# Patient Record
Sex: FEMALE | Race: WHITE | NOT HISPANIC OR LATINO | Employment: FULL TIME | URBAN - METROPOLITAN AREA
[De-identification: names, ages, dates, MRNs, and addresses within clinical notes are randomized per-mention and may not be internally consistent; named-entity substitution may affect disease eponyms.]

---

## 2017-01-03 ENCOUNTER — FCC EXTENDED DOCUMENTATION (OUTPATIENT)
Dept: BEHAVIORAL HEALTH | Facility: CLINIC | Age: 32
End: 2017-01-03

## 2017-01-03 NOTE — PROGRESS NOTES
Discharge Summary  Single Session    Client Name: Dilia Lopez MRN#: [id number] YOB: 1985      Intake / Discharge Date: 8/17/16  -  1/3/2017       DSM5 Diagnoses: (Sustained by DSM5 Criteria Listed Above)  DSM5 Diagnoses: (Sustained by DSM5 Criteria Listed Above)  Diagnoses: 296.34 Major Depressive Disorder, Recurrent Episode, With psychotic features _  300.02 (F41.1) Generalized Anxiety Disorder  309.81 (F43.10) Posttraumatic Stress Disorder (includes Posttraumatic Stress Disorder for Children 6 Years and Younger)  Without dissociative symptoms  Psychosocial & Contextual Factors: good social support; working and in grad school  WHODAS 2.0 (12 item)            This questionnaire asks about difficulties due to health conditions. Health conditions  include  disease or illnesses, other health problems that may be short or long lasting,  injuries, mental health or emotional problems, and problems with alcohol or drugs.                     Think back over the past 30 days and answer these questions, thinking about how much  difficulty you had doing the following activities. For each question, please [place] only  one response.    S1 Standing for long periods such as 30 minutes? None =         1   S2 Taking care of household responsibilities? None =         1   S3 Learning a new task, for example, learning how to get to a new place? Mild =           2   S4 How much of a problem do you have joining community activities (for example, festivals, Yarsanism or other activities) in the same way as anyone else can? None =         1   S5 How much have you been emotionally affected by your health problems? None =         1     In the past 30 days, how much difficulty did you have in:   S6 Concentrating on doing something for ten minutes? Mild =           2   S7 Walking a long distance such as a kilometer (or equivalent)? None =         1   S8 Washing your whole body? None =         1   S9 Getting  dressed? None =         1   S10 Dealing with people you do not know? None =         1   S11 Maintaining a friendship? Mild =           2   S12 Your day to day work? Mild =           2     H1 Overall, in the past 30 days, how many days were these difficulties present? Record number of days 13   H2 In the past 30 days, for how many days were you totally unable to carry out your usual activities or work because of any health condition? Record number of days  0   H3 In the past 30 days, not counting the days that you were totally unable, for how many days did you cut back or reduce your usual activities or work because of any health condition? Record number of days 0             Presenting Concern:  Seeking a new therapist      Reason for Discharge:  Client did not return      Disposition at Time of Last Encounter:   Comments:   n/a     Risk Management:   Client denies a history of suicidal ideation, suicide attempts, self-injurious behavior, homicidal ideation, homicidal behavior and and other safety concerns  A safety and risk management plan has not been developed at this time, however client was given the after-hours number / 911 should there be a change in any of these risk factors.      Referred To:  PCP.  Client can return to [facility] at any point in the future if desired.         Luis Cleveland, LICSW   1/3/2017

## 2017-01-13 ENCOUNTER — TELEPHONE (OUTPATIENT)
Dept: INTERNAL MEDICINE | Facility: CLINIC | Age: 32
End: 2017-01-13

## 2017-01-13 DIAGNOSIS — M54.2 NECK PAIN: ICD-10-CM

## 2017-01-13 DIAGNOSIS — M54.9 BACK PAIN: Primary | ICD-10-CM

## 2017-01-13 NOTE — TELEPHONE ENCOUNTER
Regarding: PER PT REQUESTING PT ORDERS FOR NECK AND BACK ISSUES  Per call from PT - needs to know if she can get orders for PT due to neck and back issues or does she need to come in for an appt?  PT asking for a CB.  PT can be reached at [phone]    January 13, 2017 4:17 PM  LM for patient advising that referral being placed for Physical Therapy.  Dione Franz RN, Care Coordinator

## 2017-01-19 ENCOUNTER — THERAPY VISIT (OUTPATIENT)
Dept: PHYSICAL THERAPY | Facility: CLINIC | Age: 32
End: 2017-01-19
Payer: COMMERCIAL

## 2017-01-19 DIAGNOSIS — M54.50 LUMBAGO: Primary | ICD-10-CM

## 2017-01-19 DIAGNOSIS — M54.2 CERVICALGIA: ICD-10-CM

## 2017-01-19 PROCEDURE — 97112 NEUROMUSCULAR REEDUCATION: CPT | Mod: GP | Performed by: PHYSICAL THERAPIST

## 2017-01-19 PROCEDURE — 97161 PT EVAL LOW COMPLEX 20 MIN: CPT | Mod: GP | Performed by: PHYSICAL THERAPIST

## 2017-01-19 PROCEDURE — 97110 THERAPEUTIC EXERCISES: CPT | Mod: GP | Performed by: PHYSICAL THERAPIST

## 2017-01-20 NOTE — PROGRESS NOTES
[facility] Initial Evaluation  Physical Therapy Initial Examination/Evaluation  January 19, 2017    Dilia Lopez is a 31 year old  female referred to physical therapy by Dr. Nory Sheffield for treatment of neck, hip, and low back pain with Precautions/Restrictions/MD instructions eval and treat    Subjective:  Referring MD visit date: 1/13/17  DOI/onset: Chronic hip pain for over a decade, however, pain has become worse starting 2 weeks ago  Mechanism of injury: Acute pain starting after transferring a patient  DOS N/A  Previous treatment: PT in past with some benefit  Imaging: None  Chief Complaint:   Neck, hip, and low back pain that increases with various activities including sitting, sleeping, and lifting   Pain: rest 1 /10, activity 6/10 with prolonged sitting Described as: ache sometimes sharp Alleviated by: standing, walking Frequency: constant Progression of symptoms since initial onset: same Time of day when pain is worse: day  Sleeping: Will wake on occasion with pain  Occupation: Mental health practioner  Job duties:  Prolonged sitting, driving    Patient's goals are Decrease neck, hip, and low back pain, return to prior level of function without pain    Pertinent PMH: Chemical dependency, overweight, mental illness, depression, smoking   General Health Reported by Patient: good  Return to MD:  6 weeks if no improvement        HPI                    Objective:    System         Lumbar/SI Evaluation  ROM:    AROM Lumbar:   Flexion:            75%, mild pain  Ext:                    75%, mild pain   Side Bend:        Left:  90%, painfree    Right:   Rotation:           Left:  66%, mild pain    Right:  66%, mild pain  Side Glide:        Left:  90%, painfree    Right:           Lumbar Myotomes:    T12-L3 (Hip Flex):  Left: 4+    Right: 4+  L2-4 (Quads):  Left:  5    Right:  5  L4 (Ankle DF):  Left:  5    Right:  5  L5 (Great Toe Ext): Left: 5    Right: 5   S1 (Toe Raise):  Left: 5     Right: 5        Neural Tension/Mobility:  Lumbar:  Normal              Spinal Segmental Conclusions:     Level: Hyper noted at L3, L4 and L5           Cervical/Thoracic Evaluation    AROM:  AROM Cervical:    Flexion:            100%, painfree  Extension:       100%, painfree  Rotation:         Left: 100%, painfree     Right: 100%, painfree  Side Bend:      Left: 90%, mild pain     Right:  90%, mild pain        Cervical Myotomes:  normal                              Spinal Segmental Conclusions:    Level:  Hypo at C2, C3, C7, T1 and T2                                     Hip Evaluation    Hip Strength:      Extension:  Left: 4+/5  Pain:Right: 4+/5    Pain:    Abduction:  Left: 4-/5     Pain:Right: 4-/5    Pain:                                 General     ROS    Assessment/Plan:      Patient is a 31 year old female with cervical, lumbar and both sides hip complaints.    Patient has the following significant findings with corresponding treatment plan.                Diagnosis 1:  Low back pain  Pain -  manual therapy, self management, education and home program  Decreased ROM/flexibility - manual therapy, therapeutic exercise, therapeutic activity   Decreased joint mobility - manual therapy, therapeutic exercise, therapeutic activity and home program  Decreased strength - therapeutic exercise, therapeutic activities and home program  Impaired balance - neuro re-education, therapeutic activities and home program  Diagnosis 2:  Neck pain   Pain -  manual therapy, self management, education and home program  Decreased ROM/flexibility - manual therapy, therapeutic exercise, therapeutic activity and home program  Decreased joint mobility - manual therapy, therapeutic exercise, therapeutic activity and home program  Decreased proprioception - neuro re-education, therapeutic activities and home program  Diagnosis 3:  Bilateral hip pain  Pain -  manual therapy, self management, education and home program  Decreased  ROM/flexibility - manual therapy, therapeutic exercise, therapeutic activity and home program  Decreased strength - therapeutic exercise, therapeutic activities and home program  Decreased proprioception - neuro re-education, therapeutic activities and home program     Therapy Evaluation Codes:   1) History comprised of:   Personal factors that impact the plan of care:      None.    Comorbidity factors that impact the plan of care are:      Chemical Dependency, Depression, Mental illness and Smoking.     Medications impacting care: Anti-depressant.  2) Examination of Body Systems comprised of:   Body structures and functions that impact the plan of care:      Cervical spine, Hip and Lumbar spine.   Activity limitations that impact the plan of care are:      Reading/Computer work, Sitting and Sleeping.  3) Clinical presentation characteristics are:   Stable/Uncomplicated.  4) Decision-Making    Low complexity using standardized patient assessment instrument and/or measureable assessment of functional outcome.  Cumulative Therapy Evaluation is: Low complexity.    Previous and current functional limitations:  (See Goal Flow Sheet for this information)    Short term and Long term goals: (See Goal Flow Sheet for this information)     Communication ability:  Patient appears to be able to clearly communicate and understand verbal and written communication and follow directions correctly.  Treatment Explanation - The following has been discussed with the patient:   RX ordered/plan of care  Anticipated outcomes  Possible risks and side effects  This patient would benefit from PT intervention to resume normal activities.   Rehab potential is good.    Frequency:  1 X week, once daily  Duration:  for 6 weeks  Discharge Plan:  Achieve all LTG.  Independent in home treatment program.  Reach maximal therapeutic benefit.    Please refer to the daily flowsheet for treatment today, total treatment time and time spent performing 1:1  timed codes.

## 2017-01-23 NOTE — PROGRESS NOTES
Subjective:                     and reported as 1/10.                General health as reported by patient is good.  Pertinent medical history includes:  Mental illness, depression and overweight.      Current medications:  Anti-depressants.  Current occupation is Student.    Primary job tasks include:  Prolonged sitting and driving.            Oswestry Score: 16 %                 Objective:    System    Physical Exam    General     ROS    Assessment/Plan:

## 2017-02-09 ENCOUNTER — THERAPY VISIT (OUTPATIENT)
Dept: PHYSICAL THERAPY | Facility: CLINIC | Age: 32
End: 2017-02-09
Payer: COMMERCIAL

## 2017-02-09 DIAGNOSIS — M54.50 LUMBAGO: Primary | ICD-10-CM

## 2017-02-09 DIAGNOSIS — M54.2 CERVICALGIA: ICD-10-CM

## 2017-02-09 PROCEDURE — 97110 THERAPEUTIC EXERCISES: CPT | Mod: GP | Performed by: PHYSICAL THERAPIST

## 2017-02-09 PROCEDURE — 97112 NEUROMUSCULAR REEDUCATION: CPT | Mod: GP | Performed by: PHYSICAL THERAPIST

## 2017-02-14 ENCOUNTER — THERAPY VISIT (OUTPATIENT)
Dept: PHYSICAL THERAPY | Facility: CLINIC | Age: 32
End: 2017-02-14
Payer: COMMERCIAL

## 2017-02-14 DIAGNOSIS — M54.50 LUMBAGO: ICD-10-CM

## 2017-02-14 DIAGNOSIS — M54.2 CERVICALGIA: ICD-10-CM

## 2017-02-14 PROCEDURE — 97112 NEUROMUSCULAR REEDUCATION: CPT | Mod: GP | Performed by: PHYSICAL THERAPIST

## 2017-02-14 PROCEDURE — 97140 MANUAL THERAPY 1/> REGIONS: CPT | Mod: GP | Performed by: PHYSICAL THERAPIST

## 2017-02-14 PROCEDURE — 97110 THERAPEUTIC EXERCISES: CPT | Mod: GP | Performed by: PHYSICAL THERAPIST

## 2017-03-03 ENCOUNTER — THERAPY VISIT (OUTPATIENT)
Dept: PHYSICAL THERAPY | Facility: CLINIC | Age: 32
End: 2017-03-03
Payer: COMMERCIAL

## 2017-03-03 DIAGNOSIS — M54.50 LUMBAGO: ICD-10-CM

## 2017-03-03 DIAGNOSIS — M54.2 CERVICALGIA: ICD-10-CM

## 2017-03-03 PROCEDURE — 97110 THERAPEUTIC EXERCISES: CPT | Mod: GP | Performed by: PHYSICAL THERAPIST

## 2017-03-03 PROCEDURE — 97112 NEUROMUSCULAR REEDUCATION: CPT | Mod: GP | Performed by: PHYSICAL THERAPIST

## 2017-03-03 PROCEDURE — 97140 MANUAL THERAPY 1/> REGIONS: CPT | Mod: GP | Performed by: PHYSICAL THERAPIST

## 2017-03-10 ENCOUNTER — THERAPY VISIT (OUTPATIENT)
Dept: PHYSICAL THERAPY | Facility: CLINIC | Age: 32
End: 2017-03-10
Payer: COMMERCIAL

## 2017-03-10 DIAGNOSIS — M54.50 LUMBAGO: ICD-10-CM

## 2017-03-10 DIAGNOSIS — M54.2 CERVICALGIA: ICD-10-CM

## 2017-03-10 PROCEDURE — 97140 MANUAL THERAPY 1/> REGIONS: CPT | Mod: GP | Performed by: PHYSICAL THERAPIST

## 2017-03-10 PROCEDURE — 97112 NEUROMUSCULAR REEDUCATION: CPT | Mod: GP | Performed by: PHYSICAL THERAPIST

## 2017-03-10 PROCEDURE — 97110 THERAPEUTIC EXERCISES: CPT | Mod: GP | Performed by: PHYSICAL THERAPIST

## 2017-03-28 ENCOUNTER — THERAPY VISIT (OUTPATIENT)
Dept: PHYSICAL THERAPY | Facility: CLINIC | Age: 32
End: 2017-03-28
Payer: COMMERCIAL

## 2017-03-28 DIAGNOSIS — M54.2 CERVICALGIA: ICD-10-CM

## 2017-03-28 DIAGNOSIS — M54.50 LUMBAGO: ICD-10-CM

## 2017-03-28 PROCEDURE — 97110 THERAPEUTIC EXERCISES: CPT | Mod: GP | Performed by: PHYSICAL THERAPIST

## 2017-03-28 PROCEDURE — 97112 NEUROMUSCULAR REEDUCATION: CPT | Mod: GP | Performed by: PHYSICAL THERAPIST

## 2017-03-28 PROCEDURE — 97140 MANUAL THERAPY 1/> REGIONS: CPT | Mod: GP | Performed by: PHYSICAL THERAPIST

## 2017-04-10 ENCOUNTER — THERAPY VISIT (OUTPATIENT)
Dept: PHYSICAL THERAPY | Facility: CLINIC | Age: 32
End: 2017-04-10
Payer: COMMERCIAL

## 2017-04-10 DIAGNOSIS — M54.50 LUMBAGO: ICD-10-CM

## 2017-04-10 DIAGNOSIS — M54.2 CERVICALGIA: ICD-10-CM

## 2017-04-10 PROCEDURE — 97110 THERAPEUTIC EXERCISES: CPT | Mod: GP | Performed by: PHYSICAL THERAPIST

## 2017-04-10 PROCEDURE — 97140 MANUAL THERAPY 1/> REGIONS: CPT | Mod: GP | Performed by: PHYSICAL THERAPIST

## 2017-04-10 PROCEDURE — 97112 NEUROMUSCULAR REEDUCATION: CPT | Mod: GP | Performed by: PHYSICAL THERAPIST

## 2017-04-20 ENCOUNTER — THERAPY VISIT (OUTPATIENT)
Dept: PHYSICAL THERAPY | Facility: CLINIC | Age: 32
End: 2017-04-20
Payer: COMMERCIAL

## 2017-04-20 DIAGNOSIS — M54.2 CERVICALGIA: ICD-10-CM

## 2017-04-20 DIAGNOSIS — M54.50 LUMBAGO: ICD-10-CM

## 2017-04-20 PROCEDURE — 97110 THERAPEUTIC EXERCISES: CPT | Mod: GP | Performed by: PHYSICAL THERAPIST

## 2017-04-20 PROCEDURE — 97140 MANUAL THERAPY 1/> REGIONS: CPT | Mod: GP | Performed by: PHYSICAL THERAPIST

## 2017-04-26 ENCOUNTER — ALLIED HEALTH/NURSE VISIT (OUTPATIENT)
Dept: OPHTHALMOLOGY | Facility: CLINIC | Age: 32
End: 2017-04-26
Attending: OPHTHALMOLOGY
Payer: COMMERCIAL

## 2017-04-26 DIAGNOSIS — H52.203 MYOPIC ASTIGMATISM OF BOTH EYES: Primary | ICD-10-CM

## 2017-04-26 DIAGNOSIS — H52.13 MYOPIC ASTIGMATISM OF BOTH EYES: Primary | ICD-10-CM

## 2017-04-26 PROCEDURE — 40000269 ZZH STATISTIC NO CHARGE FACILITY FEE: Mod: ZF

## 2017-04-26 ASSESSMENT — REFRACTION_MANIFEST
OD_CYLINDER: +0.50
OD_AXIS: 090
OS_SPHERE: -1.50
OS_CYLINDER: +0.50
OD_SPHERE: -1.50
OS_AXIS: 095

## 2017-04-26 ASSESSMENT — REFRACTION_WEARINGRX
OS_AXIS: 100
OD_SPHERE: -1.00
OD_AXIS: 95
OD_CYLINDER: +0.50
OS_CYLINDER: +0.50
OS_SPHERE: -1.00

## 2017-04-26 ASSESSMENT — VISUAL ACUITY
METHOD: SNELLEN - LINEAR
OS_CC: 20/20-
OD_CC: 20/20-2
CORRECTION_TYPE: GLASSES

## 2017-04-26 NOTE — NURSING NOTE
Chief Complaints and History of Present Illnesses   Patient presents with     Allied Health Visit     HPI    Symptoms:           Do you have eye pain now?:  No      Comments:  Pt had new glasses made 1 week ago from online vendor and feels like RX is off. Did not bring new glasses with today. I told her the PD or optical center might be off and causing some problems or with the change in RX she might need to adjust to them. Pt's PG are -1.00 BE and MR today is similar to the one in June 2016, I got -1.50 BE. RG equal and I fogged BE. Asked her to try new glasses first thing in the AM and wear them for as long as she can tolerate. I asked her to try this for one week. Gave her new RX and she will contact online vendor for options.   Casie Carlson COA April 26, 2017 9:50 AM

## 2017-04-26 NOTE — MR AVS SNAPSHOT
After Visit Summary   2017    Dilia Lopez    MRN: [id number]           Patient Information     Date Of Birth          1985        Visit Information        Provider Department      2017 9:00 AM [facility]        Today's Diagnoses     Myopic astigmatism of both eyes    -  1       Follow-ups after your visit        Who to contact     Please call your clinic at [phone] to:    Ask questions about your health    Make or cancel appointments    Discuss your medicines    Learn about your test results    Speak to your doctor   If you have compliments or concerns about an experience at your clinic, or if you wish to file a complaint, please contact [facility] Patient Relations at [phone] or email us at [email or web address]         Additional Information About Your Visit        MyChart Information     [employer] is an electronic gateway that provides easy, online access to your medical records. With CloudCover, you can request a clinic appointment, read your test results, renew a prescription or communicate with your care team.     To sign up for Cloud[employer] visit the website at [email or web address])   You will be asked to enter the access code listed below, as well as some personal information. Please follow the directions to create your username and password.     Your access code is: [email or web address]  Expires: 2017  6:44 PM     Your access code will  in 90 days. If you need help or a new code, please contact your [facility] or call [phone] for assistance.        Care EveryWhere ID     This is your Care EveryWhere ID. This could be used by other organizations to access your [facility] medical records  [id number]         Blood Pressure from Last 3 Encounters:   16 121/75   12/02/15 123/79   08/25/15 111/64    Weight from Last 3 Encounters:   16 78.7 kg (173 lb 8 oz)   12/02/15 81.9 kg  (180 lb 9.6 oz)   04/28/15 75.7 kg (166 lb 14.4 oz)              Today, you had the following     No orders found for display       Primary Care Provider Office Phone # Fax #    Nory Tri Sheffield [phone]       [address]  [facility] [address]        Thank you!     Thank you for choosing EYE CLINIC  for your care. Our goal is always to provide you with excellent care. Hearing back from our patients is one way we can continue to improve our services. Please take a few minutes to complete the written survey that you may receive in the mail after your visit with us. Thank you!             Your Updated Medication List - Protect others around you: Learn how to safely use, store and throw away your medicines at www.disposemymeds.org.          This list is accurate as of: 4/26/17 11:59 PM.  Always use your most recent med list.                   Brand Name Dispense Instructions for use    BACLOFEN PO      Take 10 mg by mouth 3 times daily       benzoyl peroxide 5 % Liqd     226 g    Use daily as directed       clindamycin 1 % lotion    CLEOCIN T    60 mL    Apply topically 2 times daily       cyclobenzaprine 5 MG tablet    FLEXERIL    30 tablet    Take 1 tablet (5 mg) by mouth 3 times daily as needed for muscle spasms       CYMBALTA PO      Take 60 mg by mouth daily       LAMICTAL PO      Take 300 mg by mouth daily       nicotine polacrilex 4 MG gum    [employer] NICOTINE POLACRILEX    240 each    Place 1 each (4 mg) inside cheek as needed for smoking cessation       paliperidone 1.5 MG 24 hr tablet    INVEGA     daily       SUDAFED PO      Take  by mouth as needed.       tretinoin 0.025 % cream    RETIN-A    45 g    Use every other night for 2 weeks then every night thereafter       TYLENOL PO      Take  by mouth as needed.       VISTARIL PO      Take 25 mg by mouth as needed for itching       VITAMIN D PO      Take  by mouth daily.

## 2017-05-01 ENCOUNTER — THERAPY VISIT (OUTPATIENT)
Dept: PHYSICAL THERAPY | Facility: CLINIC | Age: 32
End: 2017-05-01
Payer: COMMERCIAL

## 2017-05-01 DIAGNOSIS — M54.2 CERVICALGIA: ICD-10-CM

## 2017-05-01 DIAGNOSIS — M54.50 LUMBAGO: ICD-10-CM

## 2017-05-01 PROCEDURE — 97110 THERAPEUTIC EXERCISES: CPT | Mod: GP | Performed by: PHYSICAL THERAPIST

## 2017-05-01 PROCEDURE — 97140 MANUAL THERAPY 1/> REGIONS: CPT | Mod: GP | Performed by: PHYSICAL THERAPIST

## 2017-05-25 ENCOUNTER — OFFICE VISIT (OUTPATIENT)
Dept: INTERNAL MEDICINE | Facility: CLINIC | Age: 32
End: 2017-05-25

## 2017-05-25 VITALS
DIASTOLIC BLOOD PRESSURE: 80 MMHG | SYSTOLIC BLOOD PRESSURE: 118 MMHG | RESPIRATION RATE: 20 BRPM | HEART RATE: 63 BPM | BODY MASS INDEX: 28.2 KG/M2 | OXYGEN SATURATION: 98 % | WEIGHT: 174.7 LBS

## 2017-05-25 DIAGNOSIS — A08.4 VIRAL GASTROENTERITIS: ICD-10-CM

## 2017-05-25 DIAGNOSIS — K29.00 ACUTE SUPERFICIAL GASTRITIS WITHOUT HEMORRHAGE: Primary | ICD-10-CM

## 2017-05-25 RX ORDER — CALCIUM CARBONATE 500 MG/1
2 TABLET, CHEWABLE ORAL PRN
COMMUNITY

## 2017-05-25 ASSESSMENT — PAIN SCALES - GENERAL: PAINLEVEL: NO PAIN (0)

## 2017-05-25 NOTE — PATIENT INSTRUCTIONS
[facility] Medication Refill Request Information:  * Please contact your pharmacy regarding ANY request for medication refills.  ** [facility] Prescription Fax = [phone]  * Please allow 3 business days for routine medication refills.  * Please allow 5 business days for controlled substance medication refills.     [facility] Test Result notification information:  *You will be notified with in 7-10 days of your appointment day regarding the results of your test.  If you are on MyChart you will be notified as soon as the provider has reviewed the results and signed off on them.    [facility] [phone]     We will try ranitidine a short course.  We would prefer to not prescribe this long-term, but it can be helpful in the short term.     Please try dietary modification (with coffee in particular) to help remove the source of irritation.     Gastritis (Adult)    Gastritis is inflammation and irritation of the stomach lining. It can be present for a short time (acute) or be long lasting (chronic). Gastritis is often caused by infection with bacteria called H pylori. More than a third of people in the  have this bacteria in their bodies. In many cases, H pylori causes no problems or symptoms. In some people, though, the infection irritates the stomach lining and causes gastritis. Other causes of stomach irritation include drinking alcohol or taking pain-relieving medicines called NSAIDs (such as aspirin or ibuprofen).   Symptoms of gastritis can include:    Abdominal pain or bloating    Loss of appetite    Nausea or vomiting    Vomiting blood or having black stools    Feeling more tired than usual  An inflamed and irritated stomach lining is more likely to develop a sore called an ulcer. To help prevent this, gastritis should be treated.  Home care  If needed, medicines may be prescribed. If you have H pylori infection, treating it will likely relieve your symptoms. Other changes can help  reduce stomach irritation and help it heal.    If you have been prescribed medicines for H pylori infection, take them as directed. Take all of the medicine until it is finished or your healthcare provider tells you to stop, even if you feel better.    Your healthcare provider may recommend avoiding NSAIDs. If you take daily aspirin for your heart or other medical reasons, do not stop without talking to your healthcare provider first.    Avoid drinking alcohol.    Stop smoking. Smoking can irritate the stomach and delay healing. As much as possible, stay away from second hand smoke.  Follow-up care  Follow up with your healthcare provider, or as advised by our staff. Testing may be needed to check for inflammation or an ulcer.  When to seek medical advice  Call your healthcare provider for any of the following:    Stomach pain that gets worse or moves to the lower right abdomen (appendix area)    Chest pain that appears or gets worse, or spreads to the back, neck, shoulder, or arm    Frequent vomiting (can t keep down liquids)    Blood in the stool or vomit (red or black in color)    Feeling weak or dizzy    Fever of 100.4 F (38 C) or higher, or as directed by your healthcare provider    6373-8422 [employer]. [address] [place] [address], [place] [address]. All rights reserved. This information is not intended as a substitute for professional medical care. Always follow your healthcare professional's instructions.

## 2017-05-25 NOTE — MR AVS SNAPSHOT
After Visit Summary   5/25/2017    Dilia Lopez    MRN: [id number]           Patient Information     Date Of Birth          1985        Visit Information        Provider Department      5/25/2017 1:35 PM Flex Ibarra MD [facility]        Today's Diagnoses     Acute superficial gastritis without hemorrhage    -  1      Care Instructions    Primary Care Center Medication Refill Request Information:  * Please contact your pharmacy regarding ANY request for medication refills.  ** [facility] Prescription Fax = [phone]  * Please allow 3 business days for routine medication refills.  * Please allow 5 business days for controlled substance medication refills.     Primary Care Center Test Result notification information:  *You will be notified with in 7-10 days of your appointment day regarding the results of your test.  If you are on MyChart you will be notified as soon as the provider has reviewed the results and signed off on them.    [facility] [phone]     We will try ranitidine a short course.  We would prefer to not prescribe this long-term, but it can be helpful in the short term.     Please try dietary modification (with coffee in particular) to help remove the source of irritation.     Gastritis (Adult)    Gastritis is inflammation and irritation of the stomach lining. It can be present for a short time (acute) or be long lasting (chronic). Gastritis is often caused by infection with bacteria called H pylori. More than a third of people in the [place] have this bacteria in their bodies. In many cases, H pylori causes no problems or symptoms. In some people, though, the infection irritates the stomach lining and causes gastritis. Other causes of stomach irritation include drinking alcohol or taking pain-relieving medicines called NSAIDs (such as aspirin or ibuprofen).   Symptoms of gastritis can include:    Abdominal pain or bloating    Loss of  appetite    Nausea or vomiting    Vomiting blood or having black stools    Feeling more tired than usual  An inflamed and irritated stomach lining is more likely to develop a sore called an ulcer. To help prevent this, gastritis should be treated.  Home care  If needed, medicines may be prescribed. If you have H pylori infection, treating it will likely relieve your symptoms. Other changes can help reduce stomach irritation and help it heal.    If you have been prescribed medicines for H pylori infection, take them as directed. Take all of the medicine until it is finished or your healthcare provider tells you to stop, even if you feel better.    Your healthcare provider may recommend avoiding NSAIDs. If you take daily aspirin for your heart or other medical reasons, do not stop without talking to your healthcare provider first.    Avoid drinking alcohol.    Stop smoking. Smoking can irritate the stomach and delay healing. As much as possible, stay away from second hand smoke.  Follow-up care  Follow up with your healthcare provider, or as advised by our staff. Testing may be needed to check for inflammation or an ulcer.  When to seek medical advice  Call your healthcare provider for any of the following:    Stomach pain that gets worse or moves to the lower right abdomen (appendix area)    Chest pain that appears or gets worse, or spreads to the back, neck, shoulder, or arm    Frequent vomiting (can t keep down liquids)    Blood in the stool or vomit (red or black in color)    Feeling weak or dizzy    Fever of 100.4 F (38 C) or higher, or as directed by your healthcare provider    7128-9937 [employer]. [address] [place] [address], [place] [address]. All rights reserved. This information is not intended as a substitute for professional medical care. Always follow your healthcare professional's instructions.                Follow-ups after your visit        Follow-up notes from your care team     Return in  about 2 months (around 2017) for with me or Dr. Sheffield. .      Your next 10 appointments already scheduled     2017  1:00 PM CDT   (Arrive by 12:45 PM)   PHYSICAL with Nory Sheffield MD   [facility] ([facility])    [address] [place] [address] [id number] [phone]              Who to contact     Please call your clinic at [phone] to:    Ask questions about your health    Make or cancel appointments    Discuss your medicines    Learn about your test results    Speak to your doctor   If you have compliments or concerns about an experience at your clinic, or if you wish to file a complaint, please contact [facility] Patient Relations at [phone] or email us at [email or web address]         Additional Information About Your Visit        [employer] Information     [employer] is an electronic gateway that provides easy, online access to your medical records. With El[employer]serve, you can request a clinic appointment, read your test results, renew a prescription or communicate with your care team.     To sign up for [employer] visit the website at [email or web address]   You will be asked to enter the access code listed below, as well as some personal information. Please follow the directions to create your username and password.     Your access code is: [email or web address]  Expires: 2017  6:44 PM     Your access code will  in 90 days. If you need help or a new code, please contact your [facility] or call [phone] for assistance.        Care EveryWhere ID     This is your Care EveryWhere ID. This could be used by other organizations to access your [place] medical records  [id number]        Your Vitals Were     Pulse Respirations Pulse Oximetry BMI (Body Mass Index)          63 20 98% 28.2 kg/m2         Blood Pressure from Last 3 Encounters:   17  118/80   07/21/16 121/75   12/02/15 123/79    Weight from Last 3 Encounters:   05/25/17 79.2 kg (174 lb 11.2 oz)   07/21/16 78.7 kg (173 lb 8 oz)   12/02/15 81.9 kg (180 lb 9.6 oz)              Today, you had the following     No orders found for display         Today's Medication Changes          These changes are accurate as of: 5/25/17  1:54 PM.  If you have any questions, ask your nurse or doctor.               Start taking these medicines.        Dose/Directions    ranitidine 150 MG tablet   Commonly known as:  ZANTAC   Used for:  Acute superficial gastritis without hemorrhage   Started by:  Flex Ibarra MD        Dose:  150 mg   Take 1 tablet (150 mg) by mouth 2 times daily   Quantity:  60 tablet   Refills:  1            Where to get your medicines      These medications were sent to [facility] [id number] [place] [address] [address] [place] [address] [address]VE [address] AT [facility] OF [facility]  [address] [facility] [address]     Phone:  [phone]     ranitidine 150 MG tablet                Primary Care Provider Office Phone # Fax #    Nory Tri Sheffield [phone]       [address] [address]        Thank you!     Thank you for choosing [facility]  for your care. Our goal is always to provide you with excellent care. Hearing back from our patients is one way we can continue to improve our services. Please take a few minutes to complete the written survey that you may receive in the mail after your visit with us. Thank you!             Your Updated Medication List - Protect others around you: Learn how to safely use, store and throw away your medicines at www.disposemymeds.org.          This list is accurate as of: 5/25/17  1:54 PM.  Always use your most recent med list.                   Brand Name Dispense Instructions for use    BACLOFEN PO      Take 10 mg by mouth 3 times daily       benzoyl peroxide 5 % Liqd      226 g    Use daily as directed       calcium carbonate 500 MG chewable tablet    TUMS     Take 2 chew tab by mouth as needed for heartburn       clindamycin 1 % lotion    CLEOCIN T    60 mL    Apply topically 2 times daily       cyclobenzaprine 5 MG tablet    FLEXERIL    30 tablet    Take 1 tablet (5 mg) by mouth 3 times daily as needed for muscle spasms       CYMBALTA PO      Take 90 mg by mouth daily       LAMICTAL PO      Take 225 mg by mouth daily       nicotine polacrilex 4 MG gum    [employer]S NICOTINE POLACRILEX    240 each    Place 1 each (4 mg) inside cheek as needed for smoking cessation       paliperidone 1.5 MG 24 hr tablet    INVEGA     daily       ranitidine 150 MG tablet    ZANTAC    60 tablet    Take 1 tablet (150 mg) by mouth 2 times daily       SUDAFED PO      Take  by mouth as needed.       tretinoin 0.025 % cream    RETIN-A    45 g    Use every other night for 2 weeks then every night thereafter       TYLENOL PO      Take  by mouth as needed.       VISTARIL PO      Take 25 mg by mouth as needed for itching       VITAMIN D PO      Take  by mouth daily.

## 2017-05-25 NOTE — PROGRESS NOTES
SUBJECTIVE:   Dilia Lopez is a 32 year old year old female here for:  Abdominal pain after eating and intermittent diarrhea.      Abdominal pain has been going on for a few months.  Worse with eating/drinking.  Coffee, spicy and fatty foods made it worse.  Does not drink EtOH, smoke or eat tomatoes.  No dry cough, not worse with lying down at night.  Occurs immediately after eating.  Abdominal pain is epigastric, does not radiate.  Pain improved with Tums PRN.  Also associated with nausea either immediately before pain onset or after pain onset.  No nausea when there is no abd pain.  No vomiting. Paternal GF did have partial gastrectomy for reflux.      Diarrhea has been going on for similar time frame, but not temporally associated (diarrhea not directly associated with abd pain).  Diarrhea is intermittent.  Sometimes loose stools, sometimes liquid with urgency.  No black/bloody stools.  No family history of Crohn's, UC or IBS known.  She has not had liquid stools or urgency in past two weeks without addition of any medications.  She thinks diarrhea may be related to stress, but she isn't totally sure.      No fevers, chills, chest pain, SOB, palpitations, new numbness/tingling/weakness, dysuria, hematuria, HA, dysphagia or dysarthria.  She does note intermittent night sweats for the past several months.      Review of systems:  10 point ROS negative except as noted above.    PMH: PTSD, anxiety, severe recurrent major depression with psychotic features.     Family history: No crohns, UC or colon CA. Grandfather w/ partial gastrectomy.       Current Outpatient Prescriptions on File Prior to Visit:  nicotine polacrilex ([employer]S NICOTINE POLACRILEX) 4 MG gum Place 1 each (4 mg) inside cheek as needed for smoking cessation   paliperidone (INVEGA) 1.5 MG 24 hr tablet daily   tretinoin (RETIN-A) 0.025 % cream Use every other night for 2 weeks then every night thereafter   benzoyl peroxide 5 % LIQD Use daily as directed    clindamycin (CLEOCIN T) 1 % lotion Apply topically 2 times daily   BACLOFEN PO Take 10 mg by mouth 3 times daily   cyclobenzaprine (FLEXERIL) 5 MG tablet Take 1 tablet (5 mg) by mouth 3 times daily as needed for muscle spasms   LamoTRIgine (LAMICTAL PO) Take 225 mg by mouth daily    DULoxetine HCl (CYMBALTA PO) Take 90 mg by mouth daily    HydrOXYzine Pamoate (VISTARIL PO) Take 25 mg by mouth as needed for itching   Cholecalciferol (VITAMIN D PO) Take  by mouth daily.   Acetaminophen (TYLENOL PO) Take  by mouth as needed.   Pseudoephedrine HCl (SUDAFED PO) Take  by mouth as needed.     No current facility-administered medications on file prior to visit.      OBJECTIVE:  Physical exam:  /80 (BP Location: Right arm, Patient Position: Chair, Cuff Size: Adult Regular)  Pulse 63  Resp 20  Wt 79.2 kg (174 lb 11.2 oz)  SpO2 98%  BMI 28.2 kg/m2  Body mass index is 28.2 kg/(m^2).   Gen: Well-developed, well-nourished and in no apparent distress  HEENT: normocephalic, atraumatic, no scleral icterus, cranial nerves II-XII grossly intact  CV: regular rate and rhythm, normal S1 S2 and no murmur, click, or rub  Resp: clear to ausculation bilaterally, normal respiratory effort  Abd: bowel sounds presents, soft. Mild epigastric tenderness to deep palpation, non-distended  Psych: normal mood, normal affect, alert and oriented x3    ASSESSMENT and PLAN:   Dilia was seen today for pain.    Diagnoses and all orders for this visit:    Acute superficial gastritis without hemorrhage.  Symptoms correlate with gastritis.  She has no signs of bleeding/danger signs currently, so lower concern for erosion of stomach or duodenum.  Will trial ranitidine x2 months with NO additional refills.  She will RTC in two months for follow-up.  Can pursue additional workup at that time if symptoms are not improved with ranitidine and diet modifications.  Discussed elimination of exacerbating food/drink such as coffee, fatty foods to eliminate  source of irritation.  She is not using any NSAIDS currently.  She is agreeable to this plan and was provided educational resources on discharge from clinic.   -     ranitidine (ZANTAC) 150 MG tablet; Take 1 tablet (150 mg) by mouth 2 times daily    Viral gastroenteritis.  Suspect gastroenteritis.  Doesn't correlate with IBS, since she has been stresed for the past two weeks and diarrhea has progressively resolved.  Not black or bloody.  Will monitor for now.  Pt agreeable to adding yogurt to diet.  She does not think this is associated with lactose.  Return to clinic if symptoms recur and are persistent.     Return to clinic in 2 months with myself or Dr. Sheffield.     Patient seen and discussed with Dr. Keller who agrees with this plan.    Flex Ibarra M.D.   PGY-2 Internal Medicine   Pager: [phone]    Pt was seen and examined with Dr. Ibarra.  I agree with his documentation as noted above.    My additional comments: None    Kurtis Kelelr MD

## 2017-05-26 DIAGNOSIS — Z72.0 TOBACCO ABUSE: ICD-10-CM

## 2017-05-26 DIAGNOSIS — L70.0 ACNE VULGARIS: ICD-10-CM

## 2017-05-26 NOTE — TELEPHONE ENCOUNTER
Last seen 8/31/17, no future appts at this time      1. Plantar wart, right foot: Cryotherapy on 7/27/2015. Discussed benefit at starting a salicylic acid treatment at home in addition to ongoing cryotherapy.    Plantar wart was pared down with a #15 blade prior to cryotherapy.    Cryotherapy procedure note (performed by faculty with medical student assistance): After verbal consent and discussion of risks and benefits including but no limited to dyspigmentation/scar, blister, infection, recurrence, the papule on the right plantar foot was treated with 1-2mm freeze border for 2 cycles with liquid nitrogen. Post cryotherapy instructions were provided.     Start Mediplast (salicylic acid 40% plaster) home treatment. Wait three days after today's cryotherapy to start. Can keep the mediplast on all day or just at night.     2. Acne vulgaris, mild, on topicals only: Continue current treatment plan:    Topical retin-A, 0.025% cream QHS; benzoyl peroxide, 5% liquid daily; clindamycin, 1 % lotion BID      Follow-up in 1 month, earlier for new or changing lesions.

## 2017-05-26 NOTE — TELEPHONE ENCOUNTER
nicotine polacrilex (NICORETTE) 4 MG gum      Last Written Prescription Date: 11-9-16  Last Fill Quantity: 240, # refills: 1    Last Office Visit :  5-25-17   Next Office Visit: 7-7-17    BP Readings from Last 3 Encounters:   05/25/17 118/80   07/21/16 121/75   12/02/15 123/79       Kathleen M Doege RN

## 2017-06-14 ENCOUNTER — TELEPHONE (OUTPATIENT)
Dept: DERMATOLOGY | Facility: CLINIC | Age: 32
End: 2017-06-14

## 2017-06-15 NOTE — TELEPHONE ENCOUNTER
Prior Authorization Retail Medication Request  Medication/Dose: BPO wash 5% liquid cleanser  Diagnosis and ICD code: Acne vulgaris (L70.0)  New/Renewal/Insurance Change PA: Insurance change - on med for 1yr+  Previously Tried and Failed Therapies: BPO wash, tretinoin, spironolactone, clindamycin, hydroxyzine    Insurance ID (if provided): [id number]   Insurance Phone (if provided): [phone]

## 2017-06-19 NOTE — TELEPHONE ENCOUNTER
PA Initiation    Medication: BPO wash 5% liquid cleanser  Insurance Company: [place] Medicaid ([facility]) - Phone [phone] Fax [phone]  Pharmacy Filling the Rx: [facility] [id number] [place] [address] [address] AT [facility] OF [address]  Filling Pharmacy Phone: [phone]  Filling Pharmacy Fax: [phone]  Start Date: 6/19/2017

## 2017-06-22 NOTE — TELEPHONE ENCOUNTER
PRIOR AUTHORIZATION DENIED    Medication: BPO wash 5% liquid cleanser - denied    Denial Date: 6/20/2017    Denial Rational: script is denied because it is not covered by pt's plan                Appeal Information:

## 2017-06-22 NOTE — TELEPHONE ENCOUNTER
Prior Authorization Not Required for NDC [id number]      Medication: BPO wash 5% liquid cleanser - denied  Approved Dose/Quantity:   Reference #: CMM key# [id number]   Insurance Company: [place] Medicaid ([facility]) - Phone [phone] Fax [phone]  Expected CoPay: n/a     CoPay Card Available:      Foundation Assistance Needed:    Which Pharmacy is filling the prescription (Not needed for infusion/clinic administered): [email or web address] [id number] [place] [address] [address] AT [facility] OF [address] [place] [address]  Pharmacy Notified: Yes  Patient Notified: Yes      Spoke with pt's plan and no PA is required for NDC listed above. I spoke with the pharmacy and they will have to see if they are able to get that specific NDC. Pharmacy will contact pt when they are able to process the script. The pt paid cash for script.

## 2017-06-22 NOTE — TELEPHONE ENCOUNTER
Message was left with Dilia to call clinic. When patient calls back ask if she would like to proceed with PA as it will take more time. Medication costs roughly 10-15$ retail if does not want to wait.

## 2017-07-25 DIAGNOSIS — K29.00 ACUTE SUPERFICIAL GASTRITIS WITHOUT HEMORRHAGE: ICD-10-CM

## 2017-07-25 NOTE — TELEPHONE ENCOUNTER
Patient requesting refill of ranitidine, as doesn't have enough to last to follow-up appointment with Dr. Sheffield on 9/1/17.   Last appointment in [facility] 5/25/17 with Dr. Ibarra.

## 2017-08-08 DIAGNOSIS — Z72.0 TOBACCO ABUSE: ICD-10-CM

## 2017-08-08 NOTE — TELEPHONE ENCOUNTER
Nicotine 4 mg gum      Last Written Prescription Date: 5-26-17  Last Fill Quantity: 240, # refills: 1    Last Office Visit :  5-25-17   Next Office Visit: 9-1-17    BP Readings from Last 3 Encounters:   05/25/17 118/80   07/21/16 121/75   12/02/15 123/79       Kathleen M Doege RN

## 2017-09-01 ENCOUNTER — OFFICE VISIT (OUTPATIENT)
Dept: INTERNAL MEDICINE | Facility: CLINIC | Age: 32
End: 2017-09-01

## 2017-09-01 VITALS
HEART RATE: 76 BPM | TEMPERATURE: 98 F | OXYGEN SATURATION: 93 % | SYSTOLIC BLOOD PRESSURE: 102 MMHG | DIASTOLIC BLOOD PRESSURE: 66 MMHG | RESPIRATION RATE: 18 BRPM | WEIGHT: 177.1 LBS | BODY MASS INDEX: 28.58 KG/M2

## 2017-09-01 DIAGNOSIS — R10.84 ABDOMINAL PAIN, GENERALIZED: ICD-10-CM

## 2017-09-01 DIAGNOSIS — Z72.0 TOBACCO ABUSE: ICD-10-CM

## 2017-09-01 DIAGNOSIS — K29.00 ACUTE SUPERFICIAL GASTRITIS WITHOUT HEMORRHAGE: ICD-10-CM

## 2017-09-01 DIAGNOSIS — R10.84 ABDOMINAL PAIN, GENERALIZED: Primary | ICD-10-CM

## 2017-09-01 LAB
ALBUMIN SERPL-MCNC: 4.2 G/DL (ref 3.4–5)
ALP SERPL-CCNC: 50 U/L (ref 40–150)
ALT SERPL W P-5'-P-CCNC: 24 U/L (ref 0–50)
ANION GAP SERPL CALCULATED.3IONS-SCNC: 5 MMOL/L (ref 3–14)
AST SERPL W P-5'-P-CCNC: 13 U/L (ref 0–45)
BILIRUB SERPL-MCNC: 0.4 MG/DL (ref 0.2–1.3)
BUN SERPL-MCNC: 11 MG/DL (ref 7–30)
CALCIUM SERPL-MCNC: 8.9 MG/DL (ref 8.5–10.1)
CHLORIDE SERPL-SCNC: 107 MMOL/L (ref 94–109)
CO2 SERPL-SCNC: 29 MMOL/L (ref 20–32)
CREAT SERPL-MCNC: 0.68 MG/DL (ref 0.52–1.04)
ERYTHROCYTE [DISTWIDTH] IN BLOOD BY AUTOMATED COUNT: 12.4 % (ref 10–15)
FERRITIN SERPL-MCNC: 43 NG/ML (ref 12–150)
GFR SERPL CREATININE-BSD FRML MDRD: >90 ML/MIN/1.7M2
GLUCOSE SERPL-MCNC: 64 MG/DL (ref 70–99)
HCT VFR BLD AUTO: 39.1 % (ref 35–47)
HGB BLD-MCNC: 13 G/DL (ref 11.7–15.7)
MCH RBC QN AUTO: 29.7 PG (ref 26.5–33)
MCHC RBC AUTO-ENTMCNC: 33.2 G/DL (ref 31.5–36.5)
MCV RBC AUTO: 90 FL (ref 78–100)
PLATELET # BLD AUTO: 191 10E9/L (ref 150–450)
POTASSIUM SERPL-SCNC: 3.9 MMOL/L (ref 3.4–5.3)
PROT SERPL-MCNC: 7.6 G/DL (ref 6.8–8.8)
RBC # BLD AUTO: 4.37 10E12/L (ref 3.8–5.2)
SODIUM SERPL-SCNC: 140 MMOL/L (ref 133–144)
WBC # BLD AUTO: 6.1 10E9/L (ref 4–11)

## 2017-09-01 ASSESSMENT — PAIN SCALES - GENERAL: PAINLEVEL: NO PAIN (0)

## 2017-09-01 NOTE — MR AVS SNAPSHOT
After Visit Summary   9/1/2017    Dilia Lopez    MRN: [id number]           Patient Information     Date Of Birth          1985        Visit Information        Provider Department      9/1/2017 1:30 PM Nory Sheffield MD [facility]        Today's Diagnoses     Abdominal pain, generalized    -  1    Tobacco abuse        Acute superficial gastritis without hemorrhage          Care Instructions    Primary Care Center Medication Refill Request Information:  * Please contact your pharmacy regarding ANY request for medication refills.  ** [facility] Prescription Fax = [phone]  * Please allow 3 business days for routine medication refills.  * Please allow 5 business days for controlled substance medication refills.     Primary Care Center Test Result notification information:  *You will be notified with in 7-10 days of your appointment day regarding the results of your test.  If you are on MyChart you will be notified as soon as the provider has reviewed the results and signed off on them.    Primary Care Center [phone]                 Follow-ups after your visit        Your next 10 appointments already scheduled     Sep 01, 2017  2:30 PM CDT   LAB with  LAB   [facility] ([facility])    [address] [place] [id number] [phone]           Patient must bring picture ID. Patient should be prepared to give a urine specimen  Please do not eat 10-12 hours before your appointment if you are coming in fasting for labs on lipids, cholesterol, or glucose (sugar). Pregnant women should follow their Care Team instructions. Water with medications is okay. Do not drink coffee or other fluids. If you have concerns about taking  your medications, please ask at office or if scheduling via Astrid, send a message by clicking on Secure Messaging, Message Your Care Team.              Future tests that were ordered for you today      Open Future Orders        Priority Expected Expires Ordered    Ferritin Routine 2017    Comprehensive metabolic panel Routine 2017 9/15/2017 2017    CBC with platelets Routine 2017 9/15/2017 2017            Who to contact     Please call your clinic at [phone] to:    Ask questions about your health    Make or cancel appointments    Discuss your medicines    Learn about your test results    Speak to your doctor   If you have compliments or concerns about an experience at your clinic, or if you wish to file a complaint, please contact [facility] Patient Relations at [phone] or email us at [email or web address]         Additional Information About Your Visit        [employer]da[employer] Information     uma information technology is an electronic gateway that provides easy, online access to your medical records. With uma information technology, you can request a clinic appointment, read your test results, renew a prescription or communicate with your care team.     To sign up for uma information technology visit the website at [email or web address]   You will be asked to enter the access code listed below, as well as some personal information. Please follow the directions to create your username and password.     Your access code is: [email or web address]  Expires: 2017  6:30 AM     Your access code will  in 90 days. If you need help or a new code, please contact your [facility] Clinic or call [phone] for assistance.        Care EveryWhere ID     This is your Care EveryWhere ID. This could be used by other organizations to access your [place] medical records  [id number]        Your Vitals Were     Pulse Temperature Respirations Last Period Pulse Oximetry Breastfeeding?    76 98  F (36.7  C) (Oral) 18 2017 (Approximate) 93% No    BMI (Body Mass Index)                   28.58 kg/m2            Blood Pressure from Last 3 Encounters:   17 102/66   17  118/80   07/21/16 121/75    Weight from Last 3 Encounters:   09/01/17 80.3 kg (177 lb 1.6 oz)   05/25/17 79.2 kg (174 lb 11.2 oz)   07/21/16 78.7 kg (173 lb 8 oz)                 Today's Medication Changes          These changes are accurate as of: 9/1/17  2:18 PM.  If you have any questions, ask your nurse or doctor.               These medicines have changed or have updated prescriptions.        Dose/Directions    nicotine polacrilex 2 MG gum   Commonly known as:  NICORETTE   This may have changed:    - medication strength  - how much to take   Used for:  Tobacco abuse   Changed by:  Nory Sheffield MD        Dose:  2 mg   Place 1 each (2 mg) inside cheek as needed for smoking cessation   Quantity:  100 each   Refills:  3            Where to get your medicines      These medications were sent to [id number] [facility] [address] AT [facility] OF [address]  [address] [facility] [address]     Phone:  [phone]     nicotine polacrilex 2 MG gum    ranitidine 150 MG tablet                Primary Care Provider Office Phone # Fax #    Nory Sheffield [phone]       [address] [place] [address]  [facility] [address]        Equal Access to Services     JONES BALDWIN AH: Hadii aad ku hadasho Soomaali, waaxda luqadaha, qaybta kaalmada adeegyada, waxay shenin haytawana heart. So [facility] [phone].    ATENCIÓN: Si habla español, tiene a dill disposición servicios gratuitos de asistencia lingüística. Llame al [phone].    We comply with applicable federal civil rights laws and [place] laws. We do not discriminate on the basis of race, color, national origin, age, disability sex, sexual orientation or gender identity.            Thank you!     Thank you for choosing [facility]  for your care. Our goal is always to provide you with excellent care. Hearing back from our patients is one way we can continue to improve  our services. Please take a few minutes to complete the written survey that you may receive in the mail after your visit with us. Thank you!             Your Updated Medication List - Protect others around you: Learn how to safely use, store and throw away your medicines at www.disposemymeds.org.          This list is accurate as of: 9/1/17  2:18 PM.  Always use your most recent med list.                   Brand Name Dispense Instructions for use Diagnosis    BACLOFEN PO      Take 10 mg by mouth 3 times daily        benzoyl peroxide 5 % Liqd     226 g    Use daily as directed    Acne vulgaris       calcium carbonate 500 MG chewable tablet    TUMS     Take 2 chew tab by mouth as needed for heartburn        clindamycin 1 % lotion    CLEOCIN T    60 mL    Apply topically 2 times daily    Acne vulgaris       cyclobenzaprine 5 MG tablet    FLEXERIL    30 tablet    Take 1 tablet (5 mg) by mouth 3 times daily as needed for muscle spasms    Cervicalgia       CYMBALTA PO      Take 60 mg by mouth daily        FISH OIL PO      Take by mouth daily        HYDROXYZINE HCL PO      Take 10 mg by mouth        LAMICTAL PO      Take 250 mg by mouth daily        nicotine polacrilex 2 MG gum    NICORETTE    100 each    Place 1 each (2 mg) inside cheek as needed for smoking cessation    Tobacco abuse       paliperidone 1.5 MG 24 hr tablet    INVEGA     daily        ranitidine 150 MG tablet    ZANTAC    180 tablet    Take 1 tablet (150 mg) by mouth 2 times daily    Acute superficial gastritis without hemorrhage       SUDAFED PO      Take  by mouth as needed.        tretinoin 0.025 % cream    RETIN-A    45 g    Use every other night for 2 weeks then every night thereafter    Acne vulgaris       TYLENOL PO      Take  by mouth as needed.        VISTARIL PO      Take 25 mg by mouth as needed for itching        VITAMIN D PO      Take  by mouth daily.

## 2017-09-01 NOTE — PATIENT INSTRUCTIONS
[facility] Medication Refill Request Information:  * Please contact your pharmacy regarding ANY request for medication refills.  ** [facility] Prescription Fax = [phone]  * Please allow 3 business days for routine medication refills.  * Please allow 5 business days for controlled substance medication refills.     [facility] Test Result notification information:  *You will be notified with in 7-10 days of your appointment day regarding the results of your test.  If you are on MyChart you will be notified as soon as the provider has reviewed the results and signed off on them.    [facility] [phone]

## 2017-09-01 NOTE — PROGRESS NOTES
"CC: Annual physical exam    HPI:    Dilia Lopez is here for a routine physical.      Seen recently for abd pain.  GIven ranitidine.  Does not think it helped.  Continues to have \"gurgling\", sharp pain, and gassiness after eating.  Worse at night.  Can be associated with diarrhea.  2-3 times per week.  Tried to cut out spicy food, coffee, fatty food.  But even with bland foot she will get symptoms.  Diarrhea is maybe slightly improved.  Pain is diffuse.      She has been having increased stress recently.  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  No family hx of UC or crohns.  No blood in stools.  No weight change or fevers.  Previously had some nausea, and that has improved with the ranitidine.      Gyne:  Last pap normal.  CUrrently sexually active with one male partner using condoms.  She may be interested in IUD, versus nexplanon versus depo.      Depression screen:  PHQ-2 Score:     PHQ-2 ( 1999 Pf[employer]) 7/21/2016 4/28/2015   Q1: Little interest or pleasure in doing things 1 0   Q2: Feeling down, depressed or hopeless 1 1   PHQ-2 Score 2 1       Tobacco screen:  History   Smoking Status     Former Smoker   Smokeless Tobacco     Never Used     Comment: 0.5-1ppd; since 2008, presently using gum-7/21/16       Obesity screen:  Body mass index is 28.58 kg/(m^2).      ROS   10 point ROS negative except as per HPI    Medications, allergies, family history, and social history were reviewed and updated in the chart    Past medical history was reviewed and updated  Patient Active Problem List   Diagnosis     Depression     PTSD (post-traumatic stress disorder)     Anxiety     Myopia, bilateral     Cervicalgia     Tension headache     Severe recurrent major depression w/psychotic features, mood-congruent (H)     Lumbago       OBJECTIVE:  Physical Exam:  /66  Pulse 76  Temp 98  F (36.7  C) (Oral)  Resp 18  Wt 80.3 kg (177 lb 1.6 oz)  LMP 08/05/2017 (Approximate)  SpO2 93%  Breastfeeding? No  BMI 28.58 " kg/m2    GENERAL APPEARANCE: healthy, alert and no distress     EYES: EOMI     HENT: ear canals and TM's normal, except L TM obscured by cerumen and nose and mouth without ulcers or lesions     NECK: no adenopathy, no asymmetry, masses, or scars and thyroid normal to palpation     RESP: lungs clear to auscultation - no rales, rhonchi or wheezes     CV: regular rates and rhythm, normal S1 S2, no S3 or S4 and no murmur, click or rub -     ABDOMEN:  soft, nontender, no HSM or masses and bowel sounds normal     MS: extremities normal- no gross deformities noted     SKIN: no suspicious lesions or rashes     NEURO:  mentation intact and speech normal     PSYCH: mentation appears normal. and affect normal/bright     LYMPHATICS: No  cervical, or supraclavicular nodes      ASSESSMENT/PLAN:  # Preventive Care Visit:     Tobacco abuse  Weaning nicotine.  Decrease gum to 2mg  - nicotine polacrilex (NICORETTE) 2 MG gum  Dispense: 100 each; Refill: 3    Abdominal pain, generalized  Suspect IBS versus food intolerance.  Try cutting out lactose.  Gave info on FODMAP diet.  - Comprehensive metabolic panel  - CBC with platelets  - Ferritin    Acute superficial gastritis without hemorrhage  - ranitidine (ZANTAC) 150 MG tablet  Dispense: 180 tablet; Refill: 1    RTC: prn      Nory Sheffield MD

## 2017-09-01 NOTE — NURSING NOTE
Chief Complaint   Patient presents with     Physical     Patient is here for annual physical.      Elida Franklin LPN at 1:29 PM on 9/1/2017.

## 2017-09-05 ENCOUNTER — TELEPHONE (OUTPATIENT)
Dept: INTERNAL MEDICINE | Facility: CLINIC | Age: 32
End: 2017-09-05

## 2017-09-05 DIAGNOSIS — K58.9 IRRITABLE BOWEL SYNDROME: Primary | ICD-10-CM

## 2017-09-05 NOTE — TELEPHONE ENCOUNTER
Pt is requesting a referral for dietician    September 5, 2017 2:10 PM  LM for patient to return call to discuss.   Dione Franz RN, Care Coordinator    September 8, 2017 9:05 AM  Spoke with patient. She is requesting a dietary referral, as she is having difficulty implementing the FODMAP diet that Dr. Sheffield recommended for probable IBS. Referral placed.   Dione Franz RN, Care Coordinator

## 2017-10-11 ENCOUNTER — ALLIED HEALTH/NURSE VISIT (OUTPATIENT)
Dept: GASTROENTEROLOGY | Facility: CLINIC | Age: 32
End: 2017-10-11
Attending: DIETITIAN, REGISTERED
Payer: COMMERCIAL

## 2017-10-11 DIAGNOSIS — R14.3 FLATULENCE, ERUCTATION AND GAS PAIN: ICD-10-CM

## 2017-10-11 DIAGNOSIS — Z71.3 NUTRITIONAL COUNSELING: Primary | ICD-10-CM

## 2017-10-11 DIAGNOSIS — R14.1 FLATULENCE, ERUCTATION AND GAS PAIN: ICD-10-CM

## 2017-10-11 DIAGNOSIS — R14.2 FLATULENCE, ERUCTATION AND GAS PAIN: ICD-10-CM

## 2017-10-11 DIAGNOSIS — R19.7 DIARRHEA: ICD-10-CM

## 2017-10-11 NOTE — PROGRESS NOTES
[facility] Outpatient Medical Nutrition Therapy      Time Spent:  45 minutes  Session Type:  Initial Individual Session  Referring Physician:  Dr. Nory Sheffield    Nutrition Assessment:  Patient is here for initial visit with Registered Dietitian (RD).  Patient is a 32 y.o. female with history of PTSD, anxiety, tobacco use, depression and suspected IBS per MD. Patient c/o recent history of  sharp stomach pain, gas after eating, intermittent diarrhea, constipation and nausea. Met with her PCP and was advised to follow a low FODMAP diet with a focus on avoiding lactose. Patient stated that at first she found it hard to understand/know what to eat, but did some online research and feels she has been doing well following a low FODMAP diet with improvement in her GI symptoms. Patient has been mostly honey free, gluten free, dairy free, soy free and vinegar and onion free. Mostly staying away from tomatoes due to they cause more acid reflux/stomach burning.  Did have a couple slices of pizza the other night. Has been following a lower FODMAP diet for ~4 weeks and feeling improvement in symptoms. Has only had gas a few times per week and only had diarrhea 3x over the 4 weeks. Used to have daily bowel movements but now decreased and having a few times per week and having smaller sized stool. Reported that she is not having difficulty or pain passing stool and they are not hard just smaller amount. Feels that her diet is lower in fiber since following gluten free and lower fiber. Will chew nicorette gum and also Orbitz gum. Drinks energy drink on the weekends 1-3 x 24 oz cans. Drinks 1-4 x 12-16 oz diet soda and working on decreasing diet soda.    Diet Recall:  (two days ago OR other usual meals)   Meal Food    Breakfast Gluten free pancakes with syrup and butter and 2 fried eggs OR usually GF cereal with almond milk OR granola with almond milk   Lunch Sugar glazed almonds at fair OR chicken deli meat vegan cheese, GF  "bread with soy free [place] and banana   Dinner Sausage and mashed potatoes OR usually varies: beef, cabbage, potatoes, zucchini and carrots OR cranberry sausage with GF noodle with olive oil OR tuna salad over lettuce with cranberry and walnuts   Snacks Sugar glazed almonds at fair OR usually 0-3 snacks per day: popcorn or granola or banana   Beverages 2 x 20 oz Coffee with almond milk, diet coke, high ball organic carbonated energy drink, 32 oz water OR usual 16-32 oz water, 1-3 x 24 oz  monster energy drink/week, 1-4 x 12-16 oz diet soda, sometimes herbal teas in evening.   Alcohol Intake denies     Frequency of eating/taking out meals: 1x/week eats out. Sit down restaurant     Height:   Ht Readings from Last 1 Encounters:   07/21/16 1.676 m (5' 6\")     Weight:    Wt Readings from Last 3 Encounters:   09/01/17 80.3 kg (177 lb 1.6 oz)   05/25/17 79.2 kg (174 lb 11.2 oz)   07/21/16 78.7 kg (173 lb 8 oz)      BMI: 28.06 overweight    Labs:  On 9/1/2017 decreased glucose (64). Others reviewed.  Pertinent Medications/vitamin and mineral supplements:   Fish oil, nicorette, ranitidine, TUMS, and vitamin D.   Food Allergies:  No known food allergies  Physical Activity:  Exercises (pool, lite cardio for 30-60 minutes or 10 minutes elliptical for 10 minutes then 20-40 minutes free weights and weight machines,  2-3 times per week + walking 4-5 times per week, 20-30 minutes  Estimated Nutrition Needs based on current body weight of 80kg:   ~2000 calories (25 kcals/kg), 80g protein (1g/kg), 2000 ml fluids (~1 ml/kcal or total fluids per MD).     MALNUTRITION:  % Weight Loss:  None noted  % Intake:  No decreased intake noted  Subcutaneous Fat Loss:  None observed  Muscle Loss:  None observed  Fluid Retention:  None noted    Malnutrition Diagnosis: Patient does not meet two of the above criteria necessary for diagnosing malnutrition  In Context of:  Acute illness or injury    Nutrition Diagnosis:    Altered GI function related to " recent hx of diarrhea, constipation, nausea, gas, pain and burning in stomach after eating as evidenced by patient report.    Nutrition Prescription:    Nutrition Intervention:    1. Nutrition Education/Counseling:  Provided nutrition education on general digestion of carbohydrates, FODMAP (Fermentable Oligo-saccharides, Di-saccharides, Mono-saccharides And Polyols) foods, impact these short chain carbohydrates on GI tract, Gut microbiota and its effects on fiber. Reviewed FODMAP diet guidelines, including length of the diet,  the different phases of the diet plan with elimination phase and discussed recommendation and schedule for adding foods back in. Discussed high and low FODMAP foods. Explained that many chewing gum contains polyols as sweeteners. Explained monster drinks especially if sugar free may contain polyols as well. Encouraged patient to decrease monster drinks and continue to decrease diet soda. Encouraged patient to keep a food journal and write down all food and beverages consumed, time consumed and track any GI symptoms with goal of identifying her threshold for High FODMAP foods and/or tolerance or intolerance to foods. Suggested patient plan meals ahead of time. Since patient has been following low FODMAP diet for 4 weeks, recommended starting challenge phase of adding back in 1-2 higher FODMAP foods at a time every 3-4 days. See goals below. Discussed adequate hydration.     Due to patient report of having some acid reflux type symptoms and burning feeling in stomach sometimes, reviewed GERD diet and lifestyle tips including recommended foods (as tolerated) and foods not recommended if causing symptoms such as chocolate, caffeine, peppermint and spearmint, alcohol, spicy foods and fatty foods as well as recommendation not to smoke. Also recommended not eating 2-3 hours before bedtime.      Patient expressed understanding of diet education and interested in focusing on those goals below at this  time.    Educational Materials Provided:  FODMAP table and food list, GERD nutrition therapy, Fiber/Soluble fiber handout.    Patient verbalized understanding of education provided. See Goals below.     Goals:  1. Keep a daily food journal (write down everything you eat, drink, the amount you eat/drink and timing of meals and also write down any GI symptoms you are having (nausea, vomiting, diarrhea, heartburn, constipation, gas, pain, bloating).    2. Plan meals ahead of time.    3. Start adding back in some higher FODMAP foods: Add in 1-2 of your favorite higher FODMAP foods and eat a serving each day for 3-4 days to see if any changes/GI issues. If tolerated, then add back in 1-2 different higher FODMAP foods for 3-4 days and so on.    4. Do not eat or drink (besides water) for 2-3 hours before bed.    Nutrition Monitoring and Evaluation: Will monitor adherence to nutrition recommendations at future RD visits.     Further Medical Nutrition Therapy:  Recommended  Next Appointment (if applicable):  Patient planning to check with her insurance to see if f/u RD visits are covered. Has scheduling information and number if interested in scheduling f/u.  Patient was encouraged to call/contact RD with any further questions.    Fabiana Moura, MS, RD, LD

## 2017-10-11 NOTE — PATIENT INSTRUCTIONS
It was a pleasure meeting you today:  -Keep a daily food journal (write down everything you eat, drink, the amount you eat/drink and timing of meals and also write down any GI symptoms you are having (nausea, vomiting, diarrhea, heartburn, constipation, gas, pain, bloating).  -Plan meals ahead of time.  -Start adding back in some higher FODMAP foods: Add in 1-2 of your favorite higher FODMAP foods and eat a serving each day for 3-4 days to see if any changes/GI issues. If tolerated, then add back in 1-2 different higher FODMAP foods for 3-4 days and so on.  -Do not eat or drink (besides water) for 2-3 hours before bed.  If you would like to schedule a follow up visit with the Registered Dietitian. Please call [phone] to schedule.

## 2017-10-11 NOTE — MR AVS SNAPSHOT
After Visit Summary   10/11/2017    Dilia Lopez    MRN: [id number]           Patient Information     Date Of Birth          1985        Visit Information        Provider Department      10/11/2017 1:00 PM Fabiana Moura RD M [facility]        Care Instructions    It was a pleasure meeting you today:  -Keep a daily food journal (write down everything you eat, drink, the amount you eat/drink and timing of meals and also write down any GI symptoms you are having (nausea, vomiting, diarrhea, heartburn, constipation, gas, pain, bloating).  -Plan meals ahead of time.  -Start adding back in some higher FODMAP foods: Add in 1-2 of your favorite higher FODMAP foods and eat a serving each day for 3-4 days to see if any changes/GI issues. If tolerated, then add back in 1-2 different higher FODMAP foods for 3-4 days and so on.  -Do not eat or drink (besides water) for 2-3 hours before bed.  If you would like to schedule a follow up visit with the Registered Dietitian. Please call [phone] to schedule.            Follow-ups after your visit        Who to contact     Please call your clinic at [phone] to:    Ask questions about your health    Make or cancel appointments    Discuss your medicines    Learn about your test results    Speak to your doctor   If you have compliments or concerns about an experience at your clinic, or if you wish to file a complaint, please contact [facility] Patient Relations at [phone] or email us at [email or web address]         Additional Information About Your Visit        [employer] Information     [employer] is an electronic gateway that provides easy, online access to your medical records. With [employer], you can request a clinic appointment, read your test results, renew a prescription or communicate with your care team.     To sign up for [employer] visit the website at [email or web address]   You  will be asked to enter the access code listed below, as well as some personal information. Please follow the directions to create your username and password.     Your access code is: [email or web address]  Expires: 2017  6:30 AM     Your access code will  in 90 days. If you need help or a new code, please contact your [facility] or call [phone] for assistance.        Care EveryWhere ID     This is your Care EveryWhere ID. This could be used by other organizations to access your [facility] medical records  [id number]         Blood Pressure from Last 3 Encounters:   17 102/66   17 118/80   16 121/75    Weight from Last 3 Encounters:   17 80.3 kg (177 lb 1.6 oz)   17 79.2 kg (174 lb 11.2 oz)   16 78.7 kg (173 lb 8 oz)              Today, you had the following     No orders found for display       Primary Care Provider Office Phone # Fax #    Nory Tri Sheffield [phone]       [address] [place] [address] [address]  [place] [address]        Equal Access to Services     TOMI BALDWIN : Hadii polo patiño hadasho Soflorenceali, waaxda luqadaha, qaybta kaalmada adeegyada, lakisha heart. So [facility] [phone].    ATENCIÓN: Si habla español, tiene a dill disposición servicios gratuitos de asistencia lingüística. [facility] [phone].    We comply with applicable federal civil rights laws and [place] laws. We do not discriminate on the basis of race, color, national origin, age, disability, sex, sexual orientation, or gender identity.            Thank you!     Thank you for choosing [facility]  for your care. Our goal is always to provide you with excellent care. Hearing back from our patients is one way we can continue to improve our services. Please take a few minutes to complete the written survey that you may receive in the mail after your visit with us. Thank you!             Your Updated  Medication List - Protect others around you: Learn how to safely use, store and throw away your medicines at www.disposemymeds.org.          This list is accurate as of: 10/11/17  1:43 PM.  Always use your most recent med list.                   Brand Name Dispense Instructions for use Diagnosis    BACLOFEN PO      Take 10 mg by mouth 3 times daily        benzoyl peroxide 5 % Liqd     226 g    Use daily as directed    Acne vulgaris       calcium carbonate 500 MG chewable tablet    TUMS     Take 2 chew tab by mouth as needed for heartburn        clindamycin 1 % lotion    CLEOCIN T    60 mL    Apply topically 2 times daily    Acne vulgaris       cyclobenzaprine 5 MG tablet    FLEXERIL    30 tablet    Take 1 tablet (5 mg) by mouth 3 times daily as needed for muscle spasms    Cervicalgia       CYMBALTA PO      Take 60 mg by mouth daily        FISH OIL PO      Take by mouth daily        HYDROXYZINE HCL PO      Take 10 mg by mouth        LAMICTAL PO      Take 250 mg by mouth daily        nicotine polacrilex 2 MG gum    NICORETTE    100 each    Place 1 each (2 mg) inside cheek as needed for smoking cessation    Tobacco abuse       paliperidone 1.5 MG 24 hr tablet    INVEGA     daily        ranitidine 150 MG tablet    ZANTAC    180 tablet    Take 1 tablet (150 mg) by mouth 2 times daily    Acute superficial gastritis without hemorrhage       SUDAFED PO      Take  by mouth as needed.        tretinoin 0.025 % cream    RETIN-A    45 g    Use every other night for 2 weeks then every night thereafter    Acne vulgaris       TYLENOL PO      Take  by mouth as needed.        VISTARIL PO      Take 25 mg by mouth as needed for itching        VITAMIN D PO      Take  by mouth daily.

## 2017-11-02 DIAGNOSIS — L70.0 ACNE VULGARIS: ICD-10-CM

## 2017-11-02 RX ORDER — CLINDAMYCIN PHOSPHATE 10 UG/ML
LOTION TOPICAL 2 TIMES DAILY
Qty: 60 ML | Refills: 3 | OUTPATIENT
Start: 2017-11-02

## 2017-11-27 DIAGNOSIS — Z72.0 TOBACCO ABUSE: ICD-10-CM

## 2017-11-27 DIAGNOSIS — L70.0 ACNE VULGARIS: ICD-10-CM

## 2017-11-27 NOTE — TELEPHONE ENCOUNTER
Last seen 8/31/16: Appointment scheduled for 2/14/18  Acne vulgaris, mild, on topicals only: Continue current treatment plan:    Topical retin-A, 0.025% cream QHS; benzoyl peroxide, 5% liquid daily; clindamycin, 1 % lotion BID      Follow-up in 1 month, earlier for new or changing lesions.

## 2017-11-27 NOTE — TELEPHONE ENCOUNTER
"nicorette   Last Written Prescription Date:  9/1/17  Last Fill Quantity: 100,   # refills: 3  Last Office Visit : 9/1/17  Future Office visit:  No future appt    Routing refill request to clinic nurse for review/approval because:  Pharmacy needs more specific instructions for use such as \" up to 6 daily\" or frequency of use        "

## 2017-11-27 NOTE — TELEPHONE ENCOUNTER
Nisha [facility] Triage[facility] Team,     Hope you all are well!     Patient is scheduled for an acne follow-up + medication renewal with [facility] Bentley. Patient took first avail. with any provider in order to have her prescription renewed. Please follow-up accordingly.     Kind Regards     Nisha

## 2017-11-28 RX ORDER — CLINDAMYCIN PHOSPHATE 10 UG/ML
LOTION TOPICAL 2 TIMES DAILY
Qty: 60 ML | Refills: 3 | Status: SHIPPED | OUTPATIENT
Start: 2017-11-28 | End: 2018-02-14

## 2018-02-14 ENCOUNTER — OFFICE VISIT (OUTPATIENT)
Dept: DERMATOLOGY | Facility: CLINIC | Age: 33
End: 2018-02-14
Payer: COMMERCIAL

## 2018-02-14 DIAGNOSIS — L70.0 ACNE VULGARIS: ICD-10-CM

## 2018-02-14 RX ORDER — CLINDAMYCIN PHOSPHATE 10 UG/ML
LOTION TOPICAL 2 TIMES DAILY
Qty: 60 ML | Refills: 3 | Status: SHIPPED | OUTPATIENT
Start: 2018-02-14

## 2018-02-14 RX ORDER — TRETINOIN 0.25 MG/G
CREAM TOPICAL
Qty: 45 G | Refills: 3 | Status: SHIPPED | OUTPATIENT
Start: 2018-02-14

## 2018-02-14 ASSESSMENT — PAIN SCALES - GENERAL: PAINLEVEL: NO PAIN (0)

## 2018-02-14 NOTE — NURSING NOTE
"Dermatology Rooming Note    Dilia Lopez's goals for this visit include:   Chief Complaint   Patient presents with     Acne     Dilia is here for a follow up on acne she states \" its pretty good\"     Alyssa Martins CMA  "

## 2018-02-14 NOTE — PROGRESS NOTES
"[facility] Dermatology Note      Dermatology Problem List:  1. Acne vulgaris  - tretinoin 0.025% cream, clindamycin 1% lotion, BPO 5% wash  2. Plantar wart  - s/p cryo    Encounter Date: Feb 14, 2018    CC:  Chief Complaint   Patient presents with     Acne     Dilia is here for a follow up on acne she states \" its pretty good\"     History of Present Illness:  Ms. Dilia Lopez is a 32 year old female who presents as a follow-up for acne. The patient was last seen on 2/14/18 when she had a wart treated with cryotherapy and started over the counter salicylic acid. She also continued tretinoin 0.025% cream, BPO 5% wash, and clindamycin 1% lotion. Today the patient reports she is using tretinoin cream, benzoyl peroxide wash, and clindamycin lotion which is working well to control her acne. She states that her pores are still slightly large, but this is not a large concern of hers. She does break out before her menstrual cycle in one spot on her left cheek. Overall she is feeling well. The patient reports no other lesions of concern at this time.     Otherwise, the patient reports no painful, bleeding, nonhealing, or pruritic lesions, and denies new or changing moles.    Past Medical History:   Patient Active Problem List   Diagnosis     Depression     PTSD (post-traumatic stress disorder)     Anxiety     Myopia, bilateral     Cervicalgia     Tension headache     Severe recurrent major depression w/psychotic features, mood-congruent (H)     Lumbago     History reviewed. No pertinent past medical history.  Past Surgical History:   Procedure Laterality Date     NO HISTORY OF SURGERY       Social History:  The patient works as a licensed occupational therapist.    Family History:  There is no family history of skin cancer.    Medications:  Current Outpatient Prescriptions   Medication Sig Dispense Refill     clindamycin (CLEOCIN T) 1 % lotion Apply topically 2 times daily 60 mL 3     nicotine polacrilex " (NICORETTE) 2 MG gum Place 1 each (2 mg) inside cheek as needed for smoking cessation Up to 6 daily. 100 each 3     HYDROXYZINE HCL PO Take 10 mg by mouth       Omega-3 Fatty Acids (FISH OIL PO) Take by mouth daily       ranitidine (ZANTAC) 150 MG tablet Take 1 tablet (150 mg) by mouth 2 times daily 180 tablet 1     benzoyl peroxide 5 % LIQD Use daily as directed 226 g 3     calcium carbonate (TUMS) 500 MG chewable tablet Take 2 chew tab by mouth as needed for heartburn       paliperidone (INVEGA) 1.5 MG 24 hr tablet daily  1     tretinoin (RETIN-A) 0.025 % cream Use every other night for 2 weeks then every night thereafter 45 g 3     BACLOFEN PO Take 10 mg by mouth 3 times daily       cyclobenzaprine (FLEXERIL) 5 MG tablet Take 1 tablet (5 mg) by mouth 3 times daily as needed for muscle spasms 30 tablet 0     LamoTRIgine (LAMICTAL PO) Take 250 mg by mouth daily        DULoxetine HCl (CYMBALTA PO) Take 60 mg by mouth daily        HydrOXYzine Pamoate (VISTARIL PO) Take 25 mg by mouth as needed for itching       Cholecalciferol (VITAMIN D PO) Take  by mouth daily.       Acetaminophen (TYLENOL PO) Take  by mouth as needed.       Pseudoephedrine HCl (SUDAFED PO) Take  by mouth as needed.       Allergies   Allergen Reactions     Clindamycin Hcl Diarrhea     Codeine Unknown     Patient in recovery     Hydrocodone Unknown     Patient in recovery     Keflex [Cephalexin Hcl] Diarrhea     Lurasidone      Other reaction(s): Extrapyramidal Side Effect  akithisia     Oxycodone Unknown     Patient in recovery     Review of Systems:  - Skin: As above in HPI. No additional skin concerns.    Physical exam:  Vitals: There were no vitals taken for this visit.  GEN: This is a well developed, well-nourished female in no acute distress, in a pleasant mood.    SKIN: Acne exam, which includes the face, neck, upper central chest, and upper central back was performed.  - One resolving inflammatory papule on the left mid cheek.  - No other  lesions of concern on areas examined.     Impression/Plan:  1. Hx of plantar wart, right foot    Patient defers treatment.     2. Acne vulgaris, mild, on topicals only: Continue current treatment plan:    Continue tretinoin 0.025% cream - apply a pea size amount to a clean, dry face. Increase to nightly as tolerated.     Continue BPO 5% wash daily.     Continue clindamycin 1% lotion - apply to face twice daily.    Discussion of kenalog injections to lesion on left cheek, patient defers at this time. Consider in future for painful sites.    Follow-up in 1 year, earlier for new or changing lesions.     Staff Involved:  Scribe Disclosure:   I, Lucho Palencia, am serving as a scribe to document services personally performed by Sarah Beth Bentley PA-C, based on data collection and the provider's statements to me.    Provider Disclosure:   The documentation recorded by the scribe accurately reflects the services I personally performed and the decisions made by me.    All risks, benefits and alternatives were discussed with patient.  Patient is in agreement and understands the assessment and plan.  All questions were answered.  Sun Screen Education was given.   Return to Clinic annually or sooner as needed.   Sarah Beth Bentley PA-C   [facility]

## 2018-02-14 NOTE — MR AVS SNAPSHOT
After Visit Summary   2018    Dilia Lopez    MRN: [id number]           Patient Information     Date Of Birth          1985        Visit Information        Provider Department      2018 9:30 AM Sarah Beth Bentley PA-C M [facility] Dermatology        Today's Diagnoses     Acne vulgaris           Follow-ups after your visit        Follow-up notes from your care team     Return in about 1 year (around 2019).      Who to contact     Please call your clinic at [phone] to:    Ask questions about your health    Make or cancel appointments    Discuss your medicines    Learn about your test results    Speak to your doctor            Additional Information About Your Visit        MyChart Information     [employer] is an electronic gateway that provides easy, online access to your medical records. With [employer], you can request a clinic appointment, read your test results, renew a prescription or communicate with your care team.     To sign up for [employer] visit the website at [email or web address]   You will be asked to enter the access code listed below, as well as some personal information. Please follow the directions to create your username and password.     Your access code is: [email or web address]  Expires: 2018  6:31 AM     Your access code will  in 90 days. If you need help or a new code, please contact your [facility] or call [phone] for assistance.        Care EveryWhere ID     This is your Care EveryWhere ID. This could be used by other organizations to access your [facility] medical records  [id number]         Blood Pressure from Last 3 Encounters:   17 102/66   17 118/80   16 121/75    Weight from Last 3 Encounters:   17 80.3 kg (177 lb 1.6 oz)   17 79.2 kg (174 lb 11.2 oz)   16 78.7 kg (173 lb 8 oz)              Today, you had the following     No orders found for display         Today's Medication  Changes          These changes are accurate as of 2/14/18 11:14 AM.  If you have any questions, ask your nurse or doctor.               These medicines have changed or have updated prescriptions.        Dose/Directions    tretinoin 0.025 % cream   Commonly known as:  RETIN-A   This may have changed:  additional instructions   Used for:  Acne vulgaris   Changed by:  Sarah Beth Bentley PA-C        Apply at bedtime to your face.   Quantity:  45 g   Refills:  3            Where to get your medicines      These medications were sent to [facility] [address] [facility] [address] [facility] [address] [facility]  [address] [facility] [address]     Phone:  [phone]     clindamycin 1 % lotion    tretinoin 0.025 % cream                Primary Care Provider Office Phone # Fax #    Nory Tri Sheffield [phone]       [address]  [facility] [address]        Equal Access to Services     JONES BALDWIN AH: Hadii aad ku hadasho Soomaali, waaxda luqadaha, qaybta kaalmada adeegyada, waxay idiin haystevenn ange donis . So [facility] [phone].    ATENCIÓN: Si habla español, tiene a dill disposición servicios gratuitos de asistencia lingüística. [facility] [phone].    We comply with applicable federal civil rights laws and [place] laws. We do not discriminate on the basis of race, color, national origin, age, disability, sex, sexual orientation, or gender identity.            Thank you!     Thank you for choosing [facility] DERMATOLOG[facility]  for your care. Our goal is always to provide you with excellent care. Hearing back from our patients is one way we can continue to improve our services. Please take a few minutes to complete the written survey that you may receive in the mail after your visit with us. Thank you!             Your Updated Medication List - Protect others around you: Learn how to safely use, store and throw away your medicines at  www.disposemymeds.org.          This list is accurate as of 2/14/18 11:14 AM.  Always use your most recent med list.                   Brand Name Dispense Instructions for use Diagnosis    BACLOFEN PO      Take 10 mg by mouth 3 times daily        benzoyl peroxide 5 % Liqd     226 g    Use daily as directed    Acne vulgaris       calcium carbonate 500 MG chewable tablet    TUMS     Take 2 chew tab by mouth as needed for heartburn        clindamycin 1 % lotion    CLEOCIN T    60 mL    Apply topically 2 times daily    Acne vulgaris       cyclobenzaprine 5 MG tablet    FLEXERIL    30 tablet    Take 1 tablet (5 mg) by mouth 3 times daily as needed for muscle spasms    Cervicalgia       CYMBALTA PO      Take 60 mg by mouth daily        FISH OIL PO      Take by mouth daily        HYDROXYZINE HCL PO      Take 10 mg by mouth        LAMICTAL PO      Take 250 mg by mouth daily        nicotine polacrilex 2 MG gum    NICORETTE    100 each    Place 1 each (2 mg) inside cheek as needed for smoking cessation Up to 6 daily.    Tobacco abuse       paliperidone 1.5 MG 24 hr tablet    INVEGA     daily        ranitidine 150 MG tablet    ZANTAC    180 tablet    Take 1 tablet (150 mg) by mouth 2 times daily    Acute superficial gastritis without hemorrhage       SUDAFED PO      Take  by mouth as needed.        tretinoin 0.025 % cream    RETIN-A    45 g    Apply at bedtime to your face.    Acne vulgaris       TYLENOL PO      Take  by mouth as needed.        VISTARIL PO      Take 25 mg by mouth as needed for itching        VITAMIN D PO      Take  by mouth daily.

## 2018-02-14 NOTE — LETTER
"2/14/2018       RE: Dilia Lopez  [address] [address]     Dear Colleague,    Thank you for referring your patient, Dilia Lopez, to the [facility] DERMATOLOG[facility] at [facility]. Please see a copy of my visit note below.    [facility] Dermatology Note      Dermatology Problem List:  1. Acne vulgaris  - tretinoin 0.025% cream, clindamycin 1% lotion, BPO 5% wash  2. Plantar wart  - s/p cryo    Encounter Date: Feb 14, 2018    CC:  Chief Complaint   Patient presents with     Acne     Dilia is here for a follow up on acne she states \" its pretty good\"     History of Present Illness:  Ms. Dilia Lopez is a 32 year old female who presents as a follow-up for acne. The patient was last seen on 2/14/18 when she had a wart treated with cryotherapy and started over the counter salicylic acid. She also continued tretinoin 0.025% cream, BPO 5% wash, and clindamycin 1% lotion. Today the patient reports she is using tretinoin cream, benzoyl peroxide wash, and clindamycin lotion which is working well to control her acne. She states that her pores are still slightly large, but this is not a large concern of hers. She does break out before her menstrual cycle in one spot on her left cheek. Overall she is feeling well. The patient reports no other lesions of concern at this time.     Otherwise, the patient reports no painful, bleeding, nonhealing, or pruritic lesions, and denies new or changing moles.    Past Medical History:   Patient Active Problem List   Diagnosis     Depression     PTSD (post-traumatic stress disorder)     Anxiety     Myopia, bilateral     Cervicalgia     Tension headache     Severe recurrent major depression w/psychotic features, mood-congruent (H)     Lumbago     History reviewed. No pertinent past medical history.  Past Surgical History:   Procedure Laterality Date     NO HISTORY OF SURGERY       Social History:  The patient works as a " licensed occupational therapist.    Family History:  There is no family history of skin cancer.    Medications:  Current Outpatient Prescriptions   Medication Sig Dispense Refill     clindamycin (CLEOCIN T) 1 % lotion Apply topically 2 times daily 60 mL 3     nicotine polacrilex (NICORETTE) 2 MG gum Place 1 each (2 mg) inside cheek as needed for smoking cessation Up to 6 daily. 100 each 3     HYDROXYZINE HCL PO Take 10 mg by mouth       Omega-3 Fatty Acids (FISH OIL PO) Take by mouth daily       ranitidine (ZANTAC) 150 MG tablet Take 1 tablet (150 mg) by mouth 2 times daily 180 tablet 1     benzoyl peroxide 5 % LIQD Use daily as directed 226 g 3     calcium carbonate (TUMS) 500 MG chewable tablet Take 2 chew tab by mouth as needed for heartburn       paliperidone (INVEGA) 1.5 MG 24 hr tablet daily  1     tretinoin (RETIN-A) 0.025 % cream Use every other night for 2 weeks then every night thereafter 45 g 3     BACLOFEN PO Take 10 mg by mouth 3 times daily       cyclobenzaprine (FLEXERIL) 5 MG tablet Take 1 tablet (5 mg) by mouth 3 times daily as needed for muscle spasms 30 tablet 0     LamoTRIgine (LAMICTAL PO) Take 250 mg by mouth daily        DULoxetine HCl (CYMBALTA PO) Take 60 mg by mouth daily        HydrOXYzine Pamoate (VISTARIL PO) Take 25 mg by mouth as needed for itching       Cholecalciferol (VITAMIN D PO) Take  by mouth daily.       Acetaminophen (TYLENOL PO) Take  by mouth as needed.       Pseudoephedrine HCl (SUDAFED PO) Take  by mouth as needed.       Allergies   Allergen Reactions     Clindamycin Hcl Diarrhea     Codeine Unknown     Patient in recovery     Hydrocodone Unknown     Patient in recovery     Keflex [Cephalexin Hcl] Diarrhea     Lurasidone      Other reaction(s): Extrapyramidal Side Effect  akithisia     Oxycodone Unknown     Patient in recovery     Review of Systems:  - Skin: As above in HPI. No additional skin concerns.    Physical exam:  Vitals: There were no vitals taken for this  visit.  GEN: This is a well developed, well-nourished female in no acute distress, in a pleasant mood.    SKIN: Acne exam, which includes the face, neck, upper central chest, and upper central back was performed.  - One resolving inflammatory papule on the left mid cheek.  - No other lesions of concern on areas examined.     Impression/Plan:  1. Hx of plantar wart, right foot    Patient defers treatment.     2. Acne vulgaris, mild, on topicals only: Continue current treatment plan:    Continue tretinoin 0.025% cream - apply a pea size amount to a clean, dry face. Increase to nightly as tolerated.     Continue BPO 5% wash daily.     Continue clindamycin 1% lotion - apply to face twice daily.    Discussion of kenalog injections to lesion on left cheek, patient defers at this time. Consider in future for painful sites.    Follow-up in 1 year, earlier for new or changing lesions.     Staff Involved:  Scribe Disclosure:   I, Lucho Palencia, am serving as a scribe to document services personally performed by Sarah Beth Bentley PA-C, based on data collection and the provider's statements to me.    Provider Disclosure:   The documentation recorded by the scribe accurately reflects the services I personally performed and the decisions made by me.    All risks, benefits and alternatives were discussed with patient.  Patient is in agreement and understands the assessment and plan.  All questions were answered.  Sun Screen Education was given.   Return to Clinic annually or sooner as needed.   Sarah Beth Bentley PA-C   [facility]

## 2018-03-01 DIAGNOSIS — Z72.0 TOBACCO ABUSE: ICD-10-CM

## 2018-03-19 DIAGNOSIS — K29.00 ACUTE SUPERFICIAL GASTRITIS WITHOUT HEMORRHAGE: ICD-10-CM

## 2018-05-02 ENCOUNTER — APPOINTMENT (OUTPATIENT)
Dept: OPTOMETRY | Facility: CLINIC | Age: 33
End: 2018-05-02

## 2018-05-02 ENCOUNTER — OFFICE VISIT (OUTPATIENT)
Dept: OPTOMETRY | Facility: CLINIC | Age: 33
End: 2018-05-02
Payer: COMMERCIAL

## 2018-05-02 DIAGNOSIS — H52.13 MYOPIA OF BOTH EYES WITH ASTIGMATISM: Primary | ICD-10-CM

## 2018-05-02 DIAGNOSIS — H52.203 MYOPIA OF BOTH EYES WITH ASTIGMATISM: Primary | ICD-10-CM

## 2018-05-02 ASSESSMENT — REFRACTION_CURRENTRX
OS_AXIS: 180
OS_BASECURVE: 8.7
OD_AXIS: 180
OS_SPHERE: -1.00
OS_SPHERE: -1.25
OD_SPHERE: -1.25
OD_BASECURVE: 8.7
OS_CYLINDER: -0.75
OS_DIAMETER: 14.0
OD_DIAMETER: 14.0
OD_SPHERE: -1.00
OD_CYLINDER: -0.75
OD_BRAND: ACUVUE 1 DAY MOIST ASTIGMATISM

## 2018-05-02 ASSESSMENT — EXTERNAL EXAM - LEFT EYE: OS_EXAM: NORMAL

## 2018-05-02 ASSESSMENT — EXTERNAL EXAM - RIGHT EYE: OD_EXAM: NORMAL

## 2018-05-02 ASSESSMENT — VISUAL ACUITY
OD_CC: 20/20-1
OS_CC: 20/20
METHOD: SNELLEN - LINEAR
CORRECTION_TYPE: GLASSES

## 2018-05-02 ASSESSMENT — SLIT LAMP EXAM - LIDS
COMMENTS: NORMAL
COMMENTS: NORMAL

## 2018-05-02 ASSESSMENT — CUP TO DISC RATIO
OD_RATIO: 0.10
OS_RATIO: 0.10

## 2018-05-02 ASSESSMENT — REFRACTION_WEARINGRX
OD_AXIS: 090
OD_CYLINDER: +0.50
OS_SPHERE: -1.50
OS_AXIS: 095
OD_SPHERE: -1.50
OS_CYLINDER: +0.50

## 2018-05-02 ASSESSMENT — REFRACTION_MANIFEST
OS_CYLINDER: +0.75
OD_SPHERE: -1.75
OD_AXIS: 095
OD_CYLINDER: +0.75
OS_AXIS: 095
OS_SPHERE: -1.75

## 2018-05-02 ASSESSMENT — TONOMETRY
IOP_METHOD: ICARE
OD_IOP_MMHG: 15
OS_IOP_MMHG: 16

## 2018-05-02 ASSESSMENT — CONF VISUAL FIELD
OD_NORMAL: 1
OS_NORMAL: 1

## 2018-05-02 NOTE — MR AVS SNAPSHOT
After Visit Summary   2018    Dilia Lopez    MRN: [id number]           Patient Information     Date Of Birth          1985        Visit Information        Provider Department      2018 10:00 AM Heidy Duran[facility]        Today's Diagnoses     Myopia of both eyes with astigmatism    -  1       Follow-ups after your visit        Who to contact     Please call your clinic at [phone] to:    Ask questions about your health    Make or cancel appointments    Discuss your medicines    Learn about your test results    Speak to your doctor            Additional Information About Your Visit        MyChart Information     [employer] is an electronic gateway that provides easy, online access to your medical records. With [employer], you can request a clinic appointment, read your test results, renew a prescription or communicate with your care team.     To sign up for [employer] visit the website at [email or web address]   You will be asked to enter the access code listed below, as well as some personal information. Please follow the directions to create your username and password.     Your access code is: [email or web address]  Expires: 2018 11:13 AM     Your access code will  in 90 days. If you need help or a new code, please contact your [facility] or call [phone] for assistance.        Care EveryWhere ID     This is your Care EveryWhere ID. This could be used by other organizations to access your [facility] medical records  [id number]         Blood Pressure from Last 3 Encounters:   17 102/66   17 118/80   16 121/75    Weight from Last 3 Encounters:   17 80.3 kg (177 lb 1.6 oz)   17 79.2 kg (174 lb 11.2 oz)   16 78.7 kg (173 lb 8 oz)              We Performed the Following     HC CONTACT LENS FITTING COSMETIC LVL 2 (77638.012)     REFRACTION [40504]        Primary Care Provider Office Phone # Fax #     Nory Sheffield [phone]       [address] [place] [address]41  [facility] [address]        Equal Access to Services     JONES BALDWIN : Pam aad ku hadacesonu Mandyezra, wasonuda dotadaha, qaritchie kaalmada kassidy, lakisha joyner jacobotis iqbal laleorarob heart. So [facility] [phone].    ATENCIÓN: Si habla español, tiene a dill disposición servicios gratuitos de asistencia lingüística. Llame al [phone].    We comply with applicable federal civil rights laws and [place] laws. We do not discriminate on the basis of race, color, national origin, age, disability, sex, sexual orientation, or gender identity.            Thank you!     Thank you for choosing [facility]  for your care. Our goal is always to provide you with excellent care. Hearing back from our patients is one way we can continue to improve our services. Please take a few minutes to complete the written survey that you may receive in the mail after your visit with us. Thank you!             Your Updated Medication List - Protect others around you: Learn how to safely use, store and throw away your medicines at www.disposemymeds.org.          This list is accurate as of 5/2/18 11:13 AM.  Always use your most recent med list.                   Brand Name Dispense Instructions for use Diagnosis    benzoyl peroxide 5 % Liqd     226 g    Use daily as directed    Acne vulgaris       calcium carbonate 500 MG chewable tablet    TUMS     Take 2 chew tab by mouth as needed for heartburn        clindamycin 1 % lotion    CLEOCIN T    60 mL    Apply topically 2 times daily    Acne vulgaris       cyclobenzaprine 5 MG tablet    FLEXERIL    30 tablet    Take 1 tablet (5 mg) by mouth 3 times daily as needed for muscle spasms    Cervicalgia       CYMBALTA PO      Take 60 mg by mouth daily        FISH OIL PO      Take by mouth daily        HYDROXYZINE HCL PO      Take 10 mg by mouth        LAMICTAL PO      Take 300 mg by mouth daily        nicotine  polacrilex 2 MG gum    NICORETTE    100 each    Place 1 each (2 mg) inside cheek as needed for smoking cessation Up to 6 daily.    Tobacco abuse       paliperidone 1.5 MG 24 hr tablet    INVEGA     daily        ranitidine 150 MG tablet    ZANTAC    180 tablet    Take 1 tablet (150 mg) by mouth 2 times daily    Acute superficial gastritis without hemorrhage       SUDAFED PO      Take  by mouth as needed.        tretinoin 0.025 % cream    RETIN-A    45 g    Apply at bedtime to your face.    Acne vulgaris       TYLENOL PO      Take  by mouth as needed.        VISTARIL PO      Take 25 mg by mouth as needed for itching        VITAMIN D PO      Take  by mouth daily.

## 2018-05-02 NOTE — PROGRESS NOTES
A/P  1.) Myopia/Astigmatism OU  -Minor change in spec Rx, updated today  -New fit in CL's, has never worn before and would like for upcoming wedding  -Best vision/comfort/fit in Dailies ACP Toric (did not like AV Moist or spherical CL)  -Challenging I&R, reviewed extensively with pt. Reviewed CL care and hygiene.  -Will send more trials to practice with. Okay to order if working well.   -Ocular health otherwise unremarkable, continue to monitor

## 2018-05-04 ENCOUNTER — OFFICE VISIT - HEALTHEAST (OUTPATIENT)
Dept: FAMILY MEDICINE | Facility: CLINIC | Age: 33
End: 2018-05-04

## 2018-05-04 DIAGNOSIS — E55.9 VITAMIN D DEFICIENCY: ICD-10-CM

## 2018-05-04 DIAGNOSIS — E66.3 OVERWEIGHT (BMI 25.0-29.9): ICD-10-CM

## 2018-05-04 DIAGNOSIS — Z00.00 ROUTINE GENERAL MEDICAL EXAMINATION AT A HEALTH CARE FACILITY: ICD-10-CM

## 2018-05-04 DIAGNOSIS — Z51.81 MEDICATION MONITORING ENCOUNTER: ICD-10-CM

## 2018-05-04 DIAGNOSIS — Z13.220 SCREENING FOR LIPID DISORDERS: ICD-10-CM

## 2018-05-04 DIAGNOSIS — R53.82 CHRONIC FATIGUE: ICD-10-CM

## 2018-05-04 DIAGNOSIS — Z12.4 SCREENING FOR CERVICAL CANCER: ICD-10-CM

## 2018-05-04 LAB
ANION GAP SERPL CALCULATED.3IONS-SCNC: 10 MMOL/L (ref 5–18)
BUN SERPL-MCNC: 13 MG/DL (ref 8–22)
CALCIUM SERPL-MCNC: 9.6 MG/DL (ref 8.5–10.5)
CHLORIDE BLD-SCNC: 104 MMOL/L (ref 98–107)
CHOLEST SERPL-MCNC: 188 MG/DL
CO2 SERPL-SCNC: 26 MMOL/L (ref 22–31)
CREAT SERPL-MCNC: 0.75 MG/DL (ref 0.6–1.1)
ERYTHROCYTE [DISTWIDTH] IN BLOOD BY AUTOMATED COUNT: 11 % (ref 11–14.5)
FASTING STATUS PATIENT QL REPORTED: YES
GFR SERPL CREATININE-BSD FRML MDRD: >60 ML/MIN/1.73M2
GLUCOSE BLD-MCNC: 78 MG/DL (ref 70–125)
HCT VFR BLD AUTO: 39.6 % (ref 35–47)
HDLC SERPL-MCNC: 64 MG/DL
HGB BLD-MCNC: 13.1 G/DL (ref 12–16)
LDLC SERPL CALC-MCNC: 113 MG/DL
MCH RBC QN AUTO: 29.6 PG (ref 27–34)
MCHC RBC AUTO-ENTMCNC: 33 G/DL (ref 32–36)
MCV RBC AUTO: 90 FL (ref 80–100)
PAP SMEAR - HIM PATIENT REPORTED: NEGATIVE
PLATELET # BLD AUTO: 213 THOU/UL (ref 140–440)
PMV BLD AUTO: 6.8 FL (ref 7–10)
POTASSIUM BLD-SCNC: 4.3 MMOL/L (ref 3.5–5)
RBC # BLD AUTO: 4.41 MILL/UL (ref 3.8–5.4)
SODIUM SERPL-SCNC: 140 MMOL/L (ref 136–145)
TRIGL SERPL-MCNC: 57 MG/DL
TSH SERPL DL<=0.005 MIU/L-ACNC: 0.88 UIU/ML (ref 0.3–5)
WBC: 3.8 THOU/UL (ref 4–11)

## 2018-05-04 ASSESSMENT — MIFFLIN-ST. JEOR: SCORE: 1486.72

## 2018-05-07 ENCOUNTER — COMMUNICATION - HEALTHEAST (OUTPATIENT)
Dept: FAMILY MEDICINE | Facility: CLINIC | Age: 33
End: 2018-05-07

## 2018-05-07 LAB — 25(OH)D3 SERPL-MCNC: 44.8 NG/ML (ref 30–80)

## 2018-05-08 LAB
HPV SOURCE: NORMAL
HUMAN PAPILLOMA VIRUS 16 DNA: NEGATIVE
HUMAN PAPILLOMA VIRUS 18 DNA: NEGATIVE
HUMAN PAPILLOMA VIRUS FINAL DIAGNOSIS: NORMAL
HUMAN PAPILLOMA VIRUS OTHER HR: NEGATIVE
SPECIMEN DESCRIPTION: NORMAL

## 2018-05-13 ENCOUNTER — HOSPITAL ENCOUNTER (EMERGENCY)
Facility: CLINIC | Age: 33
Discharge: HOME | End: 2018-05-14
Attending: EMERGENCY MEDICINE | Admitting: EMERGENCY MEDICINE
Payer: COMMERCIAL

## 2018-05-13 DIAGNOSIS — H57.8A9 SENSATION OF FOREIGN BODY IN EYE: ICD-10-CM

## 2018-05-13 DIAGNOSIS — S05.00XA CORNEAL ABRASION, UNSPECIFIED LATERALITY, INITIAL ENCOUNTER: ICD-10-CM

## 2018-05-13 PROCEDURE — 99283 EMERGENCY DEPT VISIT LOW MDM: CPT | Performed by: EMERGENCY MEDICINE

## 2018-05-13 PROCEDURE — 99283 EMERGENCY DEPT VISIT LOW MDM: CPT | Mod: Z6 | Performed by: EMERGENCY MEDICINE

## 2018-05-13 RX ORDER — PROPARACAINE HYDROCHLORIDE 5 MG/ML
SOLUTION/ DROPS OPHTHALMIC
Status: DISCONTINUED
Start: 2018-05-13 | End: 2018-05-14 | Stop reason: HOSPADM

## 2018-05-13 NOTE — ED AVS SNAPSHOT
[facility], Emergency Department    [address] [facility] [address]    Phone:  [phone]                                       Dilia Lopez   MRN: [id number]    Department:  [facility], Emergency Department   Date of Visit:  5/13/2018           Patient Information     Date Of Birth          1985        Your diagnoses for this visit were:     Corneal abrasion, unspecified laterality, initial encounter     Sensation of foreign body in eye        You were seen by Angie Horta MD.        Discharge Instructions       FOLLOW-UP:  Please make an appointment to follow up with:  - Eye Clinic (phone: [phone]) as soon as possible.    PRESCRIPTIONS / MEDICATIONS:  - Use the prescribed antibiotic eye drops as prescribed.  - You can also use preservative free artificial tears throughout the day for added relief.     RETURN TO THE EMERGENCY DEPARTMENT  Return to the Emergency Department at any time for new/worsening symptoms.       Corneal Abrasion    You have received a scratch or scrape (abrasion) to your cornea. The cornea is the clear part in the front of the eye. This sensitive area is very painful when injured. You may make tears frequently, and your vision may be blurry until the injury heals. You may be sensitive to light.  This part of the body heals quickly. You can expect the pain to go away within 24 to 48 hours. If the abrasion is large or deep, your doctor may apply an eye patch, although this is not always done. An antibiotic ointment or eye drops may also be used to prevent infection.  Numbing drops may be used to relieve the pain temporarily so that your eyes can be examined. But these drops can t be prescribed for home use because that would prevent healing and lead to more serious problems. Also, if you can t feel your eye, there is a chance of accidentally injuring it further without knowing it.  Home care    A cold pack may be applied over the eye (or eye patch) for 20 minutes  at a time, to reduce pain. To make a cold pack, put ice cubes in a plastic bag that seals at the top. Wrap the bag in a clean, thin towel or cloth.    You may use acetaminophen or ibuprofen to control pain, unless another pain medicine was prescribed. Note: If you have chronic liver or kidney disease or have ever had a stomach ulcer or GI bleeding, talk with your doctor before using these medicines.    Rest your eyes and don t read until symptoms are gone.    If you use contact lenses, don t wear them until all symptoms are gone.    If your vision is affected by the corneal abrasion or if an eye patch was applied, don t drive a motor vehicle or operate machinery until all symptoms are gone. You may have trouble judging distances using only one eye.    If your eyes are sensitive to light, try wearing sunglasses, or stay indoors until symptoms go away.  Follow-up care  Follow up with your healthcare provider, or as advised.    If no patch was put on your eye and the pain continues for more than 48 hours, you should have another exam. Contact your healthcare provider to arrange this.    If your eye was patched and you were asked to remove the patch yourself, see your healthcare provider. Contact your healthcare provider if you still have pain after the patch is removed.    If you were given a return appointment for patch removal and re-examination, be sure to keep the appointment. Leaving the patch in place longer than advised could be harmful.  When to seek medical advice  Call your healthcare provider right away if any of these occur.    Increasing eye pain or pain that does not improve after 24 hours    Discharge from the eye    Increasing redness of the eye or swelling of the eyelids    Worsening vision    Symptoms get worse after the abrasion has healed  Date Last Reviewed: 7/1/2017 2000-2017 [employer]. [address] [place] [address], [place] [address]. All rights reserved. This information is not  intended as a substitute for professional medical care. Always follow your healthcare professional's instructions.      Contact Lens Injury    Your contact lens can cause injury to the cornea. The cornea is the clear part in the front of the eye. This injury can occur if you sleep with a hard or soft contact lens in place. Or it can happen if you wear a contact lens longer than advised. There is also a greater risk of injury if your eyes dry out too much while wearing a contact lens.  The cornea is very painful when injured, but it usually heals quickly. It usually improves within 24 to 48 hours. Your healthcare provider may apply an eye patch. This is to reduce pain and speed up the healing process. An antibiotic ointment or eye drops may also be used. Healing is complete when the pain stops and there are no other symptoms, such as eye redness, tearing or discharge, or blurred vision.  Home care    Don't wear contacts until you are pain free.    A cold pack may be applied over the eye for 20 minutes at a time to reduce pain. To make a cold pack, put ice cubes in a plastic bag that seals at the top. Wrap the bag in a clean, thin towel or cloth.    Acetaminophen or ibuprofen can be used for pain, unless another medicine was prescribed. If you have chronic liver or kidney disease, or have ever had a stomach ulcer or gastrointestinal bleeding, talk with your healthcare provider before using these medicines.  If an eye patch was applied:    Apply the cold pack directly over the eye patch as described above.    If you were given a follow-up appointment for patch removal and re-exam, don t miss it. An eye patch should not be left in place for more than 48 hours, unless you are advised to do so by your healthcare provider.    Don t drive a motor vehicle or operate machinery with the patch in place. You will have trouble judging distances with only one eye.  Follow-up care  Follow up with your healthcare provider, or as  advised.  When to seek medical advice  Call your healthcare provider right away if any of these occur:    Increasing eye pain or pain that doesn't get better after 24 hours    Discharge from the eye    Increasing redness of the eye or swelling of the eyelids    Worsening vision  Date Last Reviewed: 7/1/2017 2000-2017 [employer]. [address] [place] [address]. All rights reserved. This information is not intended as a substitute for professional medical care. Always follow your healthcare professional's instructions.              24 Hour Appointment Hotline       To make an appointment at any [facility] call [phone] ([phone]). If you don't have a family doctor or clinic, we will help you find one. [facility] clinics are conveniently located to serve the needs of you and your family.             Review of your medicines      START taking        Dose / Directions Last dose taken    ofloxacin 0.3 % ophthalmic solution   Commonly known as:  OCUFLOX   Dose:  1 drop   Quantity:  1 Bottle        Place 1 drop into both eyes 3 times daily   Refills:  0          Our records show that you are taking the medicines listed below. If these are incorrect, please call your family doctor or clinic.        Dose / Directions Last dose taken    benzoyl peroxide 5 % Liqd   Quantity:  226 g        Use daily as directed   Refills:  3        calcium carbonate 500 MG chewable tablet   Commonly known as:  TUMS   Dose:  2 chew tab        Take 2 chew tab by mouth as needed for heartburn   Refills:  0        clindamycin 1 % lotion   Commonly known as:  CLEOCIN T   Quantity:  60 mL        Apply topically 2 times daily   Refills:  3        cyclobenzaprine 5 MG tablet   Commonly known as:  FLEXERIL   Dose:  5 mg   Quantity:  30 tablet        Take 1 tablet (5 mg) by mouth 3 times daily as needed for muscle spasms   Refills:  0        CYMBALTA PO   Dose:  60 mg        Take 60 mg by mouth daily   Refills:   0        FISH OIL PO        Take by mouth daily   Refills:  0        HYDROXYZINE HCL PO   Dose:  10 mg        Take 10 mg by mouth   Refills:  0        LAMICTAL PO   Dose:  300 mg        Take 300 mg by mouth daily   Refills:  0        nicotine polacrilex 2 MG gum   Commonly known as:  NICORETTE   Dose:  2 mg   Quantity:  100 each        Place 1 each (2 mg) inside cheek as needed for smoking cessation Up to 6 daily.   Refills:  3        paliperidone 1.5 MG 24 hr tablet   Commonly known as:  INVEGA        daily   Refills:  1        ranitidine 150 MG tablet   Commonly known as:  ZANTAC   Dose:  150 mg   Quantity:  180 tablet        Take 1 tablet (150 mg) by mouth 2 times daily   Refills:  1        SUDAFED PO        Take  by mouth as needed.   Refills:  0        tretinoin 0.025 % cream   Commonly known as:  RETIN-A   Quantity:  45 g        Apply at bedtime to your face.   Refills:  3        TYLENOL PO        Take  by mouth as needed.   Refills:  0        VISTARIL PO   Dose:  25 mg        Take 25 mg by mouth as needed for itching   Refills:  0        VITAMIN D PO        Take  by mouth daily.   Refills:  0                Prescriptions were sent or printed at these locations (1 Prescription)                   Other Prescriptions                Printed at Department/Unit printer (1 of 1)         ofloxacin (OCUFLOX) 0.3 % ophthalmic solution                Orders Needing Specimen Collection     None      Pending Results     No orders found for last 3 day(s).            Pending Culture Results     No orders found for last 3 day(s).            Pending Results Instructions     If you had any lab results that were not finalized at the time of your Discharge, you can call the ED Lab Result RN at [phone]. You will be contacted by this team for any positive Lab results or changes in treatment. The nurses are available 7 days a week from 10A to 6:30P.  You can leave a message 24 hours per day and they will return your call.    "     Thank you for choosing [facility]       Thank you for choosing [facility] for your care. Our goal is always to provide you with excellent care. Hearing back from our patients is one way we can continue to improve our services. Please take a few minutes to complete the written survey that you may receive in the mail after you visit with us. Thank you!        [facility] Information     [employer] lets you send messages to your doctor, view your test results, renew your prescriptions, schedule appointments and more. To sign up, go to [email or web address] . Click on \"Log in\" on the left side of the screen, which will take you to the Welcome page. Then click on \"Sign up Now\" on the right side of the page.     You will be asked to enter the access code listed below, as well as some personal information. Please follow the directions to create your username and password.     Your access code is: [email or web address]  Expires: 2018 11:13 AM     Your access code will  in 90 days. If you need help or a new code, please call your [facility] clinic or [phone].        Care EveryWhere ID     This is your Care EveryWhere ID. This could be used by other organizations to access your [facility] medical records  [id number]        Equal Access to Services     JONES BALDWIN : Pam Carrion, waaxda luqadaha, qaybta kaalmada adeotisyada, lakisha heart. So [facility] [phone].    ATENCIÓN: Si habla español, tiene a dill disposición servicios gratuitos de asistencia lingüística. Llame al [phone].    We comply with applicable federal civil rights laws and [place] laws. We do not discriminate on the basis of race, color, national origin, age, disability, sex, sexual orientation, or gender identity.            After Visit Summary       This is your record. Keep this with you and show to your community pharmacist(s) and doctor(s) at your next visit.                  "

## 2018-05-13 NOTE — ED AVS SNAPSHOT
[facility], [place], Emergency Department    [address] [facility] [facility] [address]    Phone:  [phone]                                       Dilia Lopez   MRN: [id number]    Department:  [facility], Emergency Department   Date of Visit:  5/13/2018           After Visit Summary Signature Page     I have received my discharge instructions, and my questions have been answered. I have discussed any challenges I see with this plan with the nurse or doctor.    ..........................................................................................................................................  Patient/Patient Representative Signature      ..........................................................................................................................................  Patient Representative Print Name and Relationship to Patient    ..................................................               ................................................  Date                                            Time    ..........................................................................................................................................  Reviewed by Signature/Title    ...................................................              ..............................................  Date                                                            Time

## 2018-05-14 VITALS
SYSTOLIC BLOOD PRESSURE: 130 MMHG | OXYGEN SATURATION: 99 % | DIASTOLIC BLOOD PRESSURE: 64 MMHG | TEMPERATURE: 98.7 F | RESPIRATION RATE: 16 BRPM | WEIGHT: 176.5 LBS | BODY MASS INDEX: 28.37 KG/M2 | HEART RATE: 70 BPM | HEIGHT: 66 IN

## 2018-05-14 RX ORDER — OFLOXACIN 3 MG/ML
1 SOLUTION/ DROPS OPHTHALMIC 3 TIMES DAILY
Qty: 1 BOTTLE | Refills: 0 | Status: SHIPPED | OUTPATIENT
Start: 2018-05-14 | End: 2019-01-16

## 2018-05-14 ASSESSMENT — ENCOUNTER SYMPTOMS
FEVER: 0
EYE PAIN: 1
CHILLS: 0

## 2018-05-14 NOTE — ED PROVIDER NOTES
History     Chief Complaint   Patient presents with     Eye Problem     The history is provided by the patient.     Dilia Lopez is a 32 year old female with a history of anxiety who presents to the Emergency Department for contact lens stuck in her eyes that have been on since noon.     Prior to evaluation by MD the patient reported that she had just taken out the right contact lens. She has noticed foreign body sensations in both eyes, associated with some redness, no discharge. Prior to this had just the contacts in for a day. No other known foreign body or injuries. No vision defects. No blurred or double vision. The patient denies chills, fever, vision problems or allergies to lidocaine. The patient still thinks she has the left contact lens in, but notes it had torn in half. She believes the other half is still retained. She has been trying to flush her eyes since then.     I have reviewed the Medications, Allergies, Past Medical and Surgical History, and Social History in the [employer] system.  Past Medical History:   Diagnosis Date     Anxiety      Depressive disorder      Gastroesophageal reflux disease      PTSD (post-traumatic stress disorder)        Past Surgical History:   Procedure Laterality Date     NO HISTORY OF SURGERY         Family History   Problem Relation Age of Onset     Coronary Artery Disease Maternal Grandfather      Other Cancer Maternal Grandfather      lung     Arthritis Paternal Grandmother      Macular Degeneration Paternal Grandmother      Hypertension Mother      DIABETES Mother      DMII     Hypertension Father      DIABETES Father      DMII     Hypertension Sister      Other Cancer Maternal Grandmother      brain     Depression Other      Anxiety Disorder Other      Skin Cancer No family hx of      Melanoma No family hx of      Glaucoma No family hx of        Social History   Substance Use Topics     Smoking status: Former Smoker     Smokeless tobacco: Current User      Comment:  "0.5-1ppd; since 2008, presently using gum-7/21/16 and E CIG     Alcohol use No       Current Facility-Administered Medications   Medication     proparacaine (ALCAINE) 0.5 % ophthalmic solution     Current Outpatient Prescriptions   Medication     Acetaminophen (TYLENOL PO)     calcium carbonate (TUMS) 500 MG chewable tablet     Cholecalciferol (VITAMIN D PO)     cyclobenzaprine (FLEXERIL) 5 MG tablet     DULoxetine HCl (CYMBALTA PO)     HYDROXYZINE HCL PO     HydrOXYzine Pamoate (VISTARIL PO)     LamoTRIgine (LAMICTAL PO)     Omega-3 Fatty Acids (FISH OIL PO)     paliperidone (INVEGA) 1.5 MG 24 hr tablet     Pseudoephedrine HCl (SUDAFED PO)     ranitidine (ZANTAC) 150 MG tablet     benzoyl peroxide 5 % LIQD     clindamycin (CLEOCIN T) 1 % lotion     nicotine polacrilex (NICORETTE) 2 MG gum     tretinoin (RETIN-A) 0.025 % cream        Allergies   Allergen Reactions     Clindamycin Hcl Diarrhea     Codeine Unknown     Patient in recovery     Hydrocodone Unknown     Patient in recovery     Keflex [Cephalexin Hcl] Diarrhea     Lurasidone      Other reaction(s): Extrapyramidal Side Effect  akithisia     Oxycodone Unknown     Patient in recovery       Review of Systems   Constitutional: Negative for chills and fever.   Eyes: Positive for pain ( bilateral) and redness (both). Negative for photophobia, discharge and visual disturbance.   Allergic/Immunologic: Negative for immunocompromised state.   Neurological: Negative for syncope, weakness, numbness and headaches.       Physical Exam   BP: 118/77  Pulse: 70  Temp: 98.7  F (37.1  C)  Resp: 16  Height: 167.6 cm (5' 6\")  Weight: 80.1 kg (176 lb 8 oz)  SpO2: 98 %      Physical Exam   Constitutional: She appears well-developed and well-nourished. No distress.   HENT:   Head: Normocephalic and atraumatic.   Right Ear: External ear normal.   Left Ear: External ear normal.   Mouth/Throat: Oropharynx is clear and moist.   Eyes: EOM and lids are normal. Pupils are equal, round, " and reactive to light. Lids are everted and swept, no foreign bodies found. Right eye exhibits no chemosis, no discharge, no exudate and no hordeolum. No foreign body present in the right eye. Left eye exhibits no chemosis, no discharge, no exudate and no hordeolum. No foreign body present in the left eye. Right conjunctiva is injected. Right conjunctiva has no hemorrhage. Left conjunctiva is injected. Left conjunctiva has no hemorrhage. No scleral icterus. Right eye exhibits normal extraocular motion and no nystagmus. Left eye exhibits normal extraocular motion and no nystagmus.   Slit lamp exam:       The right eye shows fluorescein uptake.        The left eye shows fluorescein uptake.   Neck: Neck supple.   Cardiovascular: Normal rate and regular rhythm.    Skin: Skin is warm and dry. She is not diaphoretic. No erythema. No pallor.   Psychiatric: She has a normal mood and affect. Judgment normal.       ED Course     ED Course     Procedures           Labs Ordered and Resulted from Time of ED Arrival Up to the Time of Departure from the ED - No data to display         Assessments & Plan (with Medical Decision Making)   IMPRESSION: 33 yo F w/ PMH notable for anxiety, depression, PTSD, GERD, who presents w/ eye irritation after retained contact lenses, as described further above in the HPI/ROS. PERRL. EOMI. Vision unaffected. Thoroughly investigated each eye, flipped eyelid, did not see any retained contact pieces. Does have a few sporadic areas in both eyes on fluorescein exam where there may be mild/superficial corneal abrasion, nothing over line of vision. Think should treat w/ topical Abx, will discuss w/ Optho to see if they have any other recommendations    DISCUSSIONS:  - w/ Optho: Reviewed case. They agree w/ plan for Abx given the hx of contact lens wearing.   - w/ Patient: I have reviewed the available findings, plan, need for follow up, and strict return instructions with the patient and her loved  one/guest. She will arrange F/U with her usual eye provider here at the . They expressed understanding and agreement with this plan. All questions answered to the best of our ability at this time.     RE-EVALUATION:  - The patient's symptoms were somewhat improved here in the ED. Eyes already starting to look a bit less injected    DISPOSITION/PLANNING:  - IMPRESSION:  Foreign body sensation after retained contact lens (w/o any current retained lens identified)  - DISPOSITION: D/C to home  --- Follow-up: w/ Ophthalmology  - RECOMMENDATIONS: Conservative symptom management, strict return instructions  --- Rx: Ofloxacin drops (TID), artificial tears      ______________________________________________________________________________    - I have reviewed the available nursing notes.          New Prescriptions    No medications on file       Final diagnoses:   None     Fabiola KERNS, am serving as a trained medical scribe to document services personally performed by Angie Horta MD, based on the provider's statements to me.   Angie KERNS MD, was physically present and have reviewed and verified the accuracy of this note documented by Fabiola De Luna.    5/13/2018   [facility], EMERGENCY DEPARTMENT     Agnie Horta MD  05/17/18 0217

## 2018-05-14 NOTE — ED TRIAGE NOTES
Patient presents with c/o eye problem. Patient states this is her first time wearing contacts, attempted to remove left contact this evening and it tore. States contact piece is moving around in eye and she has not been able to remove it. Attempted to remove right contact first and was unable to remove that one as well. Reports pain in eyes and eyes are reddened.

## 2018-05-14 NOTE — DISCHARGE INSTRUCTIONS
FOLLOW-UP:  Please make an appointment to follow up with:  - Eye Clinic (phone: [phone]) as soon as possible.    PRESCRIPTIONS / MEDICATIONS:  - Use the prescribed antibiotic eye drops as prescribed.  - You can also use preservative free artificial tears throughout the day for added relief.     RETURN TO THE EMERGENCY DEPARTMENT  Return to the Emergency Department at any time for new/worsening symptoms.       Corneal Abrasion    You have received a scratch or scrape (abrasion) to your cornea. The cornea is the clear part in the front of the eye. This sensitive area is very painful when injured. You may make tears frequently, and your vision may be blurry until the injury heals. You may be sensitive to light.  This part of the body heals quickly. You can expect the pain to go away within 24 to 48 hours. If the abrasion is large or deep, your doctor may apply an eye patch, although this is not always done. An antibiotic ointment or eye drops may also be used to prevent infection.  Numbing drops may be used to relieve the pain temporarily so that your eyes can be examined. But these drops can t be prescribed for home use because that would prevent healing and lead to more serious problems. Also, if you can t feel your eye, there is a chance of accidentally injuring it further without knowing it.  Home care    A cold pack may be applied over the eye (or eye patch) for 20 minutes at a time, to reduce pain. To make a cold pack, put ice cubes in a plastic bag that seals at the top. Wrap the bag in a clean, thin towel or cloth.    You may use acetaminophen or ibuprofen to control pain, unless another pain medicine was prescribed. Note: If you have chronic liver or kidney disease or have ever had a stomach ulcer or GI bleeding, talk with your doctor before using these medicines.    Rest your eyes and don t read until symptoms are gone.    If you use contact lenses, don t wear them until all symptoms are gone.    If  your vision is affected by the corneal abrasion or if an eye patch was applied, don t drive a motor vehicle or operate machinery until all symptoms are gone. You may have trouble judging distances using only one eye.    If your eyes are sensitive to light, try wearing sunglasses, or stay indoors until symptoms go away.  Follow-up care  Follow up with your healthcare provider, or as advised.    If no patch was put on your eye and the pain continues for more than 48 hours, you should have another exam. Contact your healthcare provider to arrange this.    If your eye was patched and you were asked to remove the patch yourself, see your healthcare provider. Contact your healthcare provider if you still have pain after the patch is removed.    If you were given a return appointment for patch removal and re-examination, be sure to keep the appointment. Leaving the patch in place longer than advised could be harmful.  When to seek medical advice  Call your healthcare provider right away if any of these occur.    Increasing eye pain or pain that does not improve after 24 hours    Discharge from the eye    Increasing redness of the eye or swelling of the eyelids    Worsening vision    Symptoms get worse after the abrasion has healed  Date Last Reviewed: 7/1/2017 2000-2017 [employer]. [address] [place] [address], [place] [id number]. All rights reserved. This information is not intended as a substitute for professional medical care. Always follow your healthcare professional's instructions.      Contact Lens Injury    Your contact lens can cause injury to the cornea. The cornea is the clear part in the front of the eye. This injury can occur if you sleep with a hard or soft contact lens in place. Or it can happen if you wear a contact lens longer than advised. There is also a greater risk of injury if your eyes dry out too much while wearing a contact lens.  The cornea is very painful when injured, but  it usually heals quickly. It usually improves within 24 to 48 hours. Your healthcare provider may apply an eye patch. This is to reduce pain and speed up the healing process. An antibiotic ointment or eye drops may also be used. Healing is complete when the pain stops and there are no other symptoms, such as eye redness, tearing or discharge, or blurred vision.  Home care    Don't wear contacts until you are pain free.    A cold pack may be applied over the eye for 20 minutes at a time to reduce pain. To make a cold pack, put ice cubes in a plastic bag that seals at the top. Wrap the bag in a clean, thin towel or cloth.    Acetaminophen or ibuprofen can be used for pain, unless another medicine was prescribed. If you have chronic liver or kidney disease, or have ever had a stomach ulcer or gastrointestinal bleeding, talk with your healthcare provider before using these medicines.  If an eye patch was applied:    Apply the cold pack directly over the eye patch as described above.    If you were given a follow-up appointment for patch removal and re-exam, don t miss it. An eye patch should not be left in place for more than 48 hours, unless you are advised to do so by your healthcare provider.    Don t drive a motor vehicle or operate machinery with the patch in place. You will have trouble judging distances with only one eye.  Follow-up care  Follow up with your healthcare provider, or as advised.  When to seek medical advice  Call your healthcare provider right away if any of these occur:    Increasing eye pain or pain that doesn't get better after 24 hours    Discharge from the eye    Increasing redness of the eye or swelling of the eyelids    Worsening vision  Date Last Reviewed: 7/1/2017 2000-2017 [employer]. [address] [place] [id number]. All rights reserved. This information is not intended as a substitute for professional medical care. Always follow your healthcare professional's  instructions.

## 2018-05-16 LAB
BKR LAB AP ABNORMAL BLEEDING: NO
BKR LAB AP BIRTH CONTROL/HORMONES: NORMAL
BKR LAB AP CERVICAL APPEARANCE: NORMAL
BKR LAB AP GYN ADEQUACY: NORMAL
BKR LAB AP GYN INTERPRETATION: NORMAL
BKR LAB AP HPV REFLEX: NORMAL
BKR LAB AP LMP: NORMAL
BKR LAB AP PATIENT STATUS: NORMAL
BKR LAB AP PREVIOUS ABNORMAL: NORMAL
BKR LAB AP PREVIOUS NORMAL: 2015
HIGH RISK?: NO
PATH REPORT.COMMENTS IMP SPEC: NORMAL
RESULT FLAG (HE HISTORICAL CONVERSION): NORMAL

## 2018-05-17 ASSESSMENT — ENCOUNTER SYMPTOMS
EYE REDNESS: 1
PHOTOPHOBIA: 0
EYE DISCHARGE: 0
NUMBNESS: 0
HEADACHES: 0
WEAKNESS: 0

## 2018-06-29 DIAGNOSIS — Z72.0 TOBACCO ABUSE: ICD-10-CM

## 2018-10-02 ENCOUNTER — TELEPHONE (OUTPATIENT)
Dept: INTERNAL MEDICINE | Facility: CLINIC | Age: 33
End: 2018-10-02

## 2018-10-02 DIAGNOSIS — K29.00 ACUTE SUPERFICIAL GASTRITIS WITHOUT HEMORRHAGE: ICD-10-CM

## 2018-10-02 NOTE — TELEPHONE ENCOUNTER
Called patient. No answered. Left voice message to call back to schedule annual clinic visit in [facility] and gave number.

## 2018-10-02 NOTE — TELEPHONE ENCOUNTER
Zantac     Last Written Prescription Date:  3-19-18  Last Fill Quantity: 180,   # refills: 1  Last Office Visit : 9-1-17  Future Office visit:  none    Routing  To PC clinic coordinator because:  over due for appt.  30 day soraya refill sent to pharmacy   Scheduling has been notified to contact the pt for appointment.

## 2018-10-26 ENCOUNTER — RECORDS - HEALTHEAST (OUTPATIENT)
Dept: LAB | Facility: HOSPITAL | Age: 33
End: 2018-10-26

## 2018-10-29 ENCOUNTER — HEALTH MAINTENANCE LETTER (OUTPATIENT)
Age: 33
End: 2018-10-29

## 2018-10-29 LAB
GAMMA INTERFERON BACKGROUND BLD IA-ACNC: 0.06 IU/ML
M TB IFN-G BLD-IMP: NEGATIVE
MITOGEN IGNF BCKGRD COR BLD-ACNC: -0.01 IU/ML
MITOGEN IGNF BCKGRD COR BLD-ACNC: 0.02 IU/ML
QTF INTERPRETATION: NORMAL
QTF MITOGEN - NIL: >10 IU/ML

## 2018-11-12 ENCOUNTER — THERAPY VISIT (OUTPATIENT)
Dept: PHYSICAL THERAPY | Facility: CLINIC | Age: 33
End: 2018-11-12
Payer: COMMERCIAL

## 2018-11-12 DIAGNOSIS — M54.50 BILATERAL LOW BACK PAIN WITHOUT SCIATICA: Primary | ICD-10-CM

## 2018-11-12 PROCEDURE — 97161 PT EVAL LOW COMPLEX 20 MIN: CPT | Mod: GP | Performed by: PHYSICAL THERAPIST

## 2018-11-12 PROCEDURE — 97110 THERAPEUTIC EXERCISES: CPT | Mod: GP | Performed by: PHYSICAL THERAPIST

## 2018-11-12 NOTE — PROGRESS NOTES
[facility] Initial Evaluation  Dilia Lopez is a 33 year old  female referred to physical therapy by Dr. Forrest for treatment of low back pain with Precautions/Restrictions/MD instructions eval and treat     Physical Therapy Initial Evaluation: Subjective History      Injury/Condition Details:  Presenting Complaint LBP   Onset Timing/Date October 2018   Mechanism Performing strength training when patient performed a deadlift resulting in significant low back pain      Symptom Behavior Details    Primary Pain Symptoms Location: Bilateral low back pain with occasional pain radiating into bilateral gluteal  Quality: sharp, shooting, sometimes achy  Frequency: Constant   Worst Pain 8/10   Best Pain 3/10   Symptom Provocators Forward bending, prolonged positioning, lifting, working   Symptom Relievers Changing positions, rest, inactivity   Time of day dependent? Worse during the day   Recent symptom change? 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   Usual physical activities  (within past year) Weight lifting   Orthopaedic history None   Notable medical history Overweight, mental illness, depression, numbness/tingling     HPI                    Objective:  System         Lumbar/SI Evaluation  ROM:    AROM Lumbar:   Flexion:            75%, moderate pain  Ext:                    50%, mild pain   Side Bend:        Left:  90%, painfree    Right:  90%, painfree  Rotation:           Left:  90%, mild pain    Right:  90%, mild pain  Side Glide:        Left:     Right:           Lumbar Myotomes:    T12-L3 (Hip Flex):  Left: 4+    Right: 4+  L2-4 (Quads):  Left:  5    Right:  5  L4 (Ankle DF):  Left:  5    Right:  5  L5 (Great Toe  Ext): Left: 5    Right: 5   S1 (Toe Raise):  Left: 5    Right: 5      Lumbar Dermtomes:  normal                Neural Tension/Mobility:  Lumbar:  Normal              Spinal Segmental Conclusions:     Level: Hypo noted at L3, L4 and L5                                        Hip Evaluation    Hip Strength:      Extension:  Left: 5/5  Pain:Right: 5/5    Pain:    Abduction:  Left: 4+/5     Pain:Right: 5/5    Pain:                                 General     ROS    Assessment/Plan:    Patient is a 33 year old female with lumbar complaints.    Patient has the following significant findings with corresponding treatment plan.                Diagnosis 1:  Low back pain  Pain -  manual therapy, splint/taping/bracing/orthotics, self management, education, directional preference exercise and home program  Decreased ROM/flexibility - manual therapy, therapeutic exercise, therapeutic activity and home program  Decreased joint mobility - manual therapy, therapeutic exercise, therapeutic activity and home program  Decreased strength - therapeutic exercise, therapeutic activities and home program  Decreased proprioception - neuro re-education, therapeutic activities and home program  Impaired muscle performance - neuro re-education and home program    Therapy Evaluation Codes:   1) History comprised of:   Personal factors that impact the plan of care:      None.    Comorbidity factors that impact the plan of care are:      Depression, Mental illness, Numbness/tingling and Overweight.     Medications impacting care: Anti-depressant.  2) Examination of Body Systems comprised of:   Body structures and functions that impact the plan of care:      Lumbar spine.   Activity limitations that impact the plan of care are:      Bending, Driving, Lifting, Sitting, Squatting/kneeling and Working.  3) Clinical presentation characteristics are:   Stable/Uncomplicated.  4) Decision-Making    Low complexity using standardized patient assessment  instrument and/or measureable assessment of functional outcome.  Cumulative Therapy Evaluation is: Low complexity.    Previous and current functional limitations:  (See Goal Flow Sheet for this information)    Short term and Long term goals: (See Goal Flow Sheet for this information)     Communication ability:  Patient appears to be able to clearly communicate and understand verbal and written communication and follow directions correctly.  Treatment Explanation - The following has been discussed with the patient:   RX ordered/plan of care  Anticipated outcomes  Possible risks and side effects  This patient would benefit from PT intervention to resume normal activities.   Rehab potential is good.    Frequency:  1 X week, once daily  Duration:  for 6 weeks  Discharge Plan:  Achieve all LTG.  Independent in home treatment program.  Reach maximal therapeutic benefit.    Please refer to the daily flowsheet for treatment today, total treatment time and time spent performing 1:1 timed codes.

## 2018-11-12 NOTE — LETTER
KAYLEY PACHECO PT  [address] [facility] [address]  [facility] [address]  [phone]    2018    Re: Dilia Lopez   :   1985  MRN:  [id number]   REFERRING PHYSICIAN:   Delmy PACHECO PT    Date of Initial Evaluation:  2018  Visits:  Rxs Used: 1  Reason for Referral:  Bilateral low back pain without sciatica    [facility] Initial Evaluation  Dilia Lopez is a 33 year old  female referred to physical therapy by Dr. Forrest for treatment of low back pain with Precautions/Restrictions/MD instructions eval and treat     Physical Therapy Initial Evaluation: Subjective History      Injury/Condition Details:  Presenting Complaint LBP   Onset Timing/Date 2018   Mechanism Performing strength training when patient performed a deadlift resulting in significant low back pain    Symptom Behavior Details    Primary Pain Symptoms Location: Bilateral low back pain with occasional pain radiating into bilateral gluteal  Quality: sharp, shooting, sometimes achy  Frequency: Constant   Worst Pain 8/10   Best Pain 3/10   Symptom Provocators Forward bending, prolonged positioning, lifting, working   Symptom Relievers Changing positions, rest, inactivity   Time of day dependent? Worse during the day   Recent symptom change? 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   Usual physical activities  (within past year) Weight lifting   Orthopaedic history None   Notable medical history Overweight, mental illness, depression, numbness/tingling                         Re: Dilia Lopez   :   1985    Objective:  System   Lumbar/SI Evaluation  ROM:    AROM Lumbar:   Flexion:            75%,  moderate pain  Ext:                    50%, mild pain   Side Bend:        Left:  90%, painfree    Right:  90%, painfree  Rotation:           Left:  90%, mild pain    Right:  90%, mild pain  Side Glide:        Left:     Right:   Lumbar Myotomes:    T12-L3 (Hip Flex):  Left: 4+    Right: 4+  L2-4 (Quads):  Left:  5    Right:  5  L4 (Ankle DF):  Left:  5    Right:  5  L5 (Great Toe Ext): Left: 5    Right: 5   S1 (Toe Raise):  Left: 5    Right: 5  Lumbar Dermtomes:  normal  Neural Tension/Mobility:  Lumbar:  Normal    Spinal Segmental Conclusions:   Level: Hypo noted at L3, L4 and L5  Hip Evaluation  Hip Strength:    Extension:  Left: 5/5  Pain:Right: 5/5    Pain:    Abduction:  Left: 4+/5     Pain:Right: 5/5    Pain:    Assessment/Plan:    Patient is a 33 year old female with lumbar complaints.    Patient has the following significant findings with corresponding treatment plan.                Diagnosis 1:  Low back pain  Pain -  manual therapy, splint/taping/bracing/orthotics, self management, education, directional preference exercise and home program  Decreased ROM/flexibility - manual therapy, therapeutic exercise, therapeutic activity and home program  Decreased joint mobility - manual therapy, therapeutic exercise, therapeutic activity and home program  Decreased strength - therapeutic exercise, therapeutic activities and home program  Decreased proprioception - neuro re-education, therapeutic activities and home program  Impaired muscle performance - neuro re-education and home program  Therapy Evaluation Codes:   1) History comprised of:   Personal factors that impact the plan of care:      None.    Comorbidity factors that impact the plan of care are:      Depression, Mental illness, Numbness/tingling and Overweight.     Medications impacting care: Anti-depressant.  2) Examination of Body Systems comprised of:   Body structures and functions that impact the plan of care:      Lumbar spine.   Activity  limitations that impact the plan of care are:      Bending, Driving, Lifting, Sitting, Squatting/kneeling and Working.  3) Clinical presentation characteristics are:   Stable/Uncomplicated.  4) Decision-Making    Low complexity using standardized patient assessment instrument and/or measureable assessment of functional outcome.  Cumulative Therapy Evaluation is: Low complexity.  Re: Dilia oLpez   :   1985    Previous and current functional limitations:  (See Goal Flow Sheet for this information)    Short term and Long term goals: (See Goal Flow Sheet for this information)     Communication ability:  Patient appears to be able to clearly communicate and understand verbal and written communication and follow directions correctly.  Treatment Explanation - The following has been discussed with the patient:   RX ordered/plan of care  Anticipated outcomes  Possible risks and side effects  This patient would benefit from PT intervention to resume normal activities.   Rehab potential is good.    Frequency:  1 X week, once daily  Duration:  for 6 weeks  Discharge Plan:  Achieve all LTG.  Independent in home treatment program.  Reach maximal therapeutic benefit.    Thank you for your referral.    INQUIRIES  Therapist: Carson Dalal, NORMA, OCS, TPI    [facility]  [address] [address] [address]  Phone: [phone]  Fax: [phone]

## 2018-11-19 DIAGNOSIS — Z72.0 TOBACCO ABUSE: ICD-10-CM

## 2018-11-19 NOTE — TELEPHONE ENCOUNTER
Last Clinic Visit: 9/1/2017  [facility]  Appointment past due.  Lexie refill for 90 days (1 month + 2 refills) and message sent to .

## 2018-11-19 NOTE — TELEPHONE ENCOUNTER
Called patient, no answered. Left message to call to schedule annual physical appointment with PCP in the Primary Care Clinic.

## 2018-11-21 ENCOUNTER — THERAPY VISIT (OUTPATIENT)
Dept: PHYSICAL THERAPY | Facility: CLINIC | Age: 33
End: 2018-11-21
Payer: COMMERCIAL

## 2018-11-21 DIAGNOSIS — M54.50 ACUTE BILATERAL LOW BACK PAIN WITHOUT SCIATICA: ICD-10-CM

## 2018-11-21 PROCEDURE — 97110 THERAPEUTIC EXERCISES: CPT | Mod: GP | Performed by: PHYSICAL THERAPIST

## 2018-11-21 PROCEDURE — 97530 THERAPEUTIC ACTIVITIES: CPT | Mod: GP | Performed by: PHYSICAL THERAPIST

## 2018-11-21 NOTE — MR AVS SNAPSHOT
"              After Visit Summary   11/21/2018    Dilia Lopez    MRN: [id number]           Patient Information     Date Of Birth          1985        Visit Information        Provider Department      11/21/2018 1:20 PM Jenny Saab PT [facility]        Today's Diagnoses     Acute bilateral low back pain without sciatica           Follow-ups after your visit        Your next 10 appointments already scheduled     Dec 05, 2018  4:40 PM CST   [facility] Sp[facility] with Jenny Saab PT   [facility] ([facility])    [address] [id number] [phone]            Jan 16, 2019  8:30 AM CST   (Arrive by 8:15 AM)   PHYSICAL with Nory Sheffield MD   [facility] ([facility])    [address] [facility] [address] [phone]              Who to contact     If you have questions or need follow up information about today's clinic visit or your schedule please contact [facility] directly at [phone].  Normal or non-critical lab and imaging results will be communicated to you by [employer], letter or phone within 4 business days after the clinic has received the results. If you do not hear from us within 7 days, please contact the clinic through [employer] or phone. If you have a critical or abnormal lab result, we will notify you by phone as soon as possible.  Submit refill requests through [employer] or call your pharmacy and they will forward the refill request to us. Please allow 3 business days for your refill to be completed.          Additional Information About Your Visit        [employer] Information     [employer] lets you send messages to your doctor, view your test results, renew your prescriptions, schedule appointments and more. To sign up, go to [email or web address] . Click on \"Log in\" on the left side of the " "screen, which will take you to the Welcome page. Then click on \"Sign up Now\" on the right side of the page.     You will be asked to enter the access code listed below, as well as some personal information. Please follow the directions to create your username and password.     Your access code is: [email or web address]  Expires: 2019  1:58 PM     Your access code will  in 90 days. If you need help or a new code, please call your [facility] clinic or [phone].        Care EveryWhere ID     This is your Care EveryWhere ID. This could be used by other organizations to access your [facility] medical records  [id number]         Blood Pressure from Last 3 Encounters:   18 130/64   17 102/66   17 118/80    Weight from Last 3 Encounters:   18 80.1 kg (176 lb 8 oz)   17 80.3 kg (177 lb 1.6 oz)   17 79.2 kg (174 lb 11.2 oz)              We Performed the Following     THERAPEUTIC ACTIVITIES     THERAPEUTIC EXERCISES        Primary Care Provider Office Phone # Fax #    Nory Tri Sheffield [phone]       [address] [place] [address]        Equal Access to Services     JONES BALDWIN : Hadii aad ku hadasho Soomaali, waaxda luqadaha, qaybta kaalmada adeegyada, waxay idiin haystevenn ange donis . So [facility] [phone].    ATENCIÓN: Si habla español, tiene a dill disposición servicios gratuitos de asistencia lingüística. [facility] [phone].    We comply with applicable federal civil rights laws and [place] laws. We do not discriminate on the basis of race, color, national origin, age, disability, sex, sexual orientation, or gender identity.            Thank you!     Thank you for choosing [facility] [place]  for your care. Our goal is always to provide you with excellent care. Hearing back from our patients is one way we can continue to improve our services. Please take a few minutes to complete the written survey that " you may receive in the mail after your visit with us. Thank you!             Your Updated Medication List - Protect others around you: Learn how to safely use, store and throw away your medicines at www.disposemymeds.org.          This list is accurate as of 11/21/18  1:58 PM.  Always use your most recent med list.                   Brand Name Dispense Instructions for use Diagnosis    benzoyl peroxide 5 % liquid     226 g    Use daily as directed    Acne vulgaris       calcium carbonate 500 MG chewable tablet    TUMS     Take 2 chew tab by mouth as needed for heartburn        clindamycin 1 % lotion    CLEOCIN T    60 mL    Apply topically 2 times daily    Acne vulgaris       cyclobenzaprine 5 MG tablet    FLEXERIL    30 tablet    Take 1 tablet (5 mg) by mouth 3 times daily as needed for muscle spasms    Cervicalgia       CYMBALTA PO      Take 60 mg by mouth daily        FISH OIL PO      Take by mouth daily        HYDROXYZINE HCL PO      Take 10 mg by mouth        LAMICTAL PO      Take 300 mg by mouth daily        nicotine polacrilex 2 MG gum    NICORETTE    150 each    Place 1 each (2 mg) inside cheek as needed for smoking cessation -Up to 6 daily. *CALL CLINIC FOR APPOINTMENT [phone]    Tobacco abuse       ofloxacin 0.3 % ophthalmic solution    OCUFLOX    1 Bottle    Place 1 drop into both eyes 3 times daily        paliperidone 1.5 MG 24 hr tablet    INVEGA     daily        ranitidine 150 MG tablet    ZANTAC    60 tablet    Take 1 tablet (150 mg) by mouth 2 times daily Need clinic visit for further refills, scheduling # [phone]    Acute superficial gastritis without hemorrhage       SUDAFED PO      Take  by mouth as needed.        tretinoin 0.025 % cream    RETIN-A    45 g    Apply at bedtime to your face.    Acne vulgaris       TYLENOL PO      Take  by mouth as needed.        VISTARIL PO      Take 25 mg by mouth as needed for itching        VITAMIN D PO      Take  by mouth daily.

## 2018-11-21 NOTE — TELEPHONE ENCOUNTER
[facility] Call Center    Phone Message    May a detailed message be left on voicemail: yes    Reason for Call: Other: The pt called to get an annual physical appt, she was able to schedule Dr Sheffield's first avail in 2019, and to ask about getting a refill for the nicorette. There is an RX in her chart, but the pharmacy listed there did not get it but said they would take a verbal if you could call it in. The script said 290 refills? Please call the pt to let her knowthe status. Thanks.     Action Taken: Message routed to:  [facility] med

## 2018-11-21 NOTE — TELEPHONE ENCOUNTER
Rx was called to [facility].  Voice message was left to notify patient that Rx was sent.      Leslye Oliver RN on 11/21/2018 at 11:37 AM

## 2018-12-05 DIAGNOSIS — K29.00 ACUTE SUPERFICIAL GASTRITIS WITHOUT HEMORRHAGE: ICD-10-CM

## 2018-12-07 NOTE — TELEPHONE ENCOUNTER
ranitidine (ZANTAC) 150 MG tablet   Last Written Prescription Date:  10/2/18  Last Fill Quantity: 60,   # refills: 1  Last Office Visit : 9/1/17  Future Office visit: 1/16/19

## 2019-01-16 ENCOUNTER — OFFICE VISIT (OUTPATIENT)
Dept: INTERNAL MEDICINE | Facility: CLINIC | Age: 34
End: 2019-01-16
Payer: COMMERCIAL

## 2019-01-16 VITALS
WEIGHT: 180 LBS | OXYGEN SATURATION: 96 % | HEART RATE: 78 BPM | DIASTOLIC BLOOD PRESSURE: 70 MMHG | BODY MASS INDEX: 29.05 KG/M2 | SYSTOLIC BLOOD PRESSURE: 103 MMHG

## 2019-01-16 DIAGNOSIS — Z72.0 TOBACCO ABUSE: ICD-10-CM

## 2019-01-16 DIAGNOSIS — K29.00 ACUTE SUPERFICIAL GASTRITIS WITHOUT HEMORRHAGE: ICD-10-CM

## 2019-01-16 DIAGNOSIS — Z23 NEED FOR PROPHYLACTIC VACCINATION AND INOCULATION AGAINST INFLUENZA: Primary | ICD-10-CM

## 2019-01-16 RX ORDER — PROPRANOLOL HYDROCHLORIDE 10 MG/1
TABLET ORAL
COMMUNITY
Start: 2018-12-24

## 2019-01-16 ASSESSMENT — ENCOUNTER SYMPTOMS
HEADACHES: 0
HYPERTENSION: 0
LOSS OF CONSCIOUSNESS: 0
COUGH DISTURBING SLEEP: 0
SNORES LOUDLY: 0
TACHYCARDIA: 0
POSTURAL DYSPNEA: 0
DISTURBANCES IN COORDINATION: 0
POOR WOUND HEALING: 0
ARTHRALGIAS: 1
PARALYSIS: 0
SPEECH CHANGE: 0
COUGH: 0
BRUISES/BLEEDS EASILY: 0
HEARTBURN: 1
SHORTNESS OF BREATH: 0
HYPOTENSION: 0
HEMATURIA: 0
HOT FLASHES: 0
FATIGUE: 1
DYSURIA: 0
LIGHT-HEADEDNESS: 0
NUMBNESS: 0
MEMORY LOSS: 0
EXTREMITY NUMBNESS: 0
DIZZINESS: 0
WHEEZING: 0
LEG SWELLING: 0
BREAST PAIN: 0
NAIL CHANGES: 0
WEAKNESS: 0
HALLUCINATIONS: 1
DEPRESSION: 1
RESPIRATORY PAIN: 0
MYALGIAS: 1
SYNCOPE: 0
SPUTUM PRODUCTION: 0
PALPITATIONS: 0
BREAST MASS: 0
ORTHOPNEA: 0
MUSCLE CRAMPS: 1
SLEEP DISTURBANCES DUE TO BREATHING: 0
DECREASED LIBIDO: 0
SKIN CHANGES: 0
EXERCISE INTOLERANCE: 0
ALTERED TEMPERATURE REGULATION: 1
DYSPNEA ON EXERTION: 0
TREMORS: 0
BACK PAIN: 1
CLAUDICATION: 0
LEG PAIN: 0
SWOLLEN GLANDS: 0
NECK PAIN: 1
NERVOUS/ANXIOUS: 1
HEMOPTYSIS: 0
TINGLING: 0
DIARRHEA: 1
FLANK PAIN: 0
DIFFICULTY URINATING: 0
SEIZURES: 0

## 2019-01-16 ASSESSMENT — PAIN SCALES - GENERAL: PAINLEVEL: MILD PAIN (2)

## 2019-01-16 NOTE — PROGRESS NOTES
"CC:   Chief Complaint   Patient presents with     Physical     Pt is here for annual physical.        History of Present Illness   Dilia Lopez is here for a routine physical.      Recent ED visit for her depression.  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  She reports she is currently at her baseline    Also herniated disc diagnsoed over the past year.  Doing PT.    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  This has added a lot of new stress, but she is enjoying her job    Gyne:  Recent pap    Depression screen:  PHQ-2 Score:     PHQ-2 ( 1999 Pfizer) 7/21/2016 4/28/2015   Q1: Little interest or pleasure in doing things 1 0   Q2: Feeling down, depressed or hopeless 1 1   PHQ-2 Score 2 1       Tobacco screen:  History   Smoking Status     Former Smoker   Smokeless Tobacco     Current User     Comment: 0.5-1ppd; since 2008, presently using gum-7/21/16 and E CIG     Smoking a little  Interested in quitting, but wants to \"do it on her own\".  She is decreasing the ecig.  No cigarettes.  Using gum.      Obesity screen:  Body mass index is 29.05 kg/m .  Eating salad 3-4 times per week  Typically veg with meals  Calcium: eating yofurt and milk on cereal daily.  Robert work on adding another glass of milk    Exercise is limited due to back.  She is walking her dog.  And doing PT exercises.  Working on core stregthening    Review of Systems   Review of Systems     Constitutional:  Positive for fatigue, recent stressors and hallucinations.   HENT:  Positive for tinnitus, gum tenderness and dry mouth.    Eyes:  Positive for eye dryness.   Respiratory:   Negative for cough, hemoptysis, sputum production, shortness of breath, wheezing, sleep disturbances due to breathing, snores loudly, respiratory pain, dyspnea on exertion, cough disturbing sleep and postural dyspnea.    Cardiovascular:  Negative for chest pain, dyspnea on exertion, palpitations, orthopnea, claudication, leg swelling, fingers/toes turn blue, " hypertension, hypotension, syncope, history of heart murmur, chest pain on exertion, chest pain at rest, pacemaker, few scattered varicosities, leg pain, sleep disturbances due to breathing, tachycardia, light-headedness, exercise intolerance and edema.   Gastrointestinal:  Positive for heartburn and diarrhea.   Genitourinary:  Negative for bladder incontinence, dysuria, urgency, hematuria, flank pain, vaginal discharge, difficulty urinating, genital sores, dyspareunia, decreased libido, nocturia, voiding less frequently, arousal difficulty, abnormal vaginal bleeding, excessive menstruation, menstrual changes, hot flashes, vaginal dryness and postmenopausal bleeding.   Musculoskeletal:  Positive for myalgias, back pain, arthralgias, muscle cramps and neck pain.   Skin:  Negative for nail changes, itching, poor wound healing, rash, hair changes, skin changes, acne, warts, poor wound healing, scarring, flaky skin, Raynaud's phenomenon, sensitivity to sunlight and skin thickening.   Neurological:  Negative for dizziness, tingling, tremors, speech change, seizures, loss of consciousness, weakness, light-headedness, numbness, headaches, disturbances in coordination, extremity numbness, memory loss, difficulty walking and paralysis.   Endo/Heme:  Negative for anemia, swollen glands and bruises/bleeds easily.   Psychiatric/Behavioral:  Positive for depression, hallucinations and mood swings. Negative for memory loss.    Breast:  Negative for breast discharge, breast mass, breast pain and nipple retraction.   Endocrine:  Positive for altered temperature regulation.Negative for unwanted hair growth and change in facial hair.      Medications   Medications and allergies were reviewed and updated in the chart    Family History   Family history was reviewed and updated as needed in the chart    Past Medical History   Past medical history was reviewed and updated  Patient Active Problem List   Diagnosis     Depression     PTSD  (post-traumatic stress disorder)     Anxiety     Myopia, bilateral     Cervicalgia     Tension headache     Severe recurrent major depression w/psychotic features, mood-congruent (H)     Lumbago     Bilateral low back pain without sciatica       Physical Exam   /70   Pulse 78   Wt 81.6 kg (180 lb)   SpO2 96%   BMI 29.05 kg/m      GENERAL APPEARANCE: healthy, alert and no distress     EYES: EOMI, PERRL     HENT: ear canals and TM's normal and nose and mouth without ulcers or lesions     NECK: no adenopathy, no asymmetry, masses, or scars and thyroid normal to palpation     RESP: lungs clear to auscultation - no rales, rhonchi or wheezes     CV: regular rates and rhythm, normal S1 S2, no S3 or S4 and no murmur, click or rub -     ABDOMEN:  soft, nontender, no HSM or masses and bowel sounds normal     MS: extremities normal- no gross deformities noted,      SKIN: no suspicious lesions or rashes     NEURO:  mentation intact and speech normal     PSYCH: mentation appears normal     LYMPHATICS: No cervical,  or supraclavicular nodes      Assessment & Plan   # Preventive Care Visit:     Need for prophylactic vaccination and inoculation against influenza  Flu shot today    Tobacco abuse  Encouraged her to talk with her therapist about becoming more committed to smoking cessation  - nicotine (NICORETTE) 2 MG gum  Dispense: 150 each; Refill: 11    Acute superficial gastritis without hemorrhage  - ranitidine (ZANTAC) 150 MG tablet  Dispense: 180 tablet; Refill: 3      RTC: No Follow-up on file.      Nory Sheffield MD

## 2019-01-16 NOTE — NURSING NOTE
Dilia Lopez      1.  Has the patient received the information for the influenza vaccine? YES    2.  Does the patient have any of the following contraindications?     Allergy to eggs? No     Allergic reaction to previous influenza vaccines? No     Any other problems to previous influenza vaccines? No     Paralyzed by Guillain-Orlando syndrome? No     Currently pregnant? NO     Current moderate or severe illness? No     Allergy to contact lens solution? No    3.  The vaccine has been administered in the usual fashion and the patient was instructed to wait 20 minutes before leaving the building in the event of an allergic reaction: YES    Vaccination given by Elida Franklin LPN at 8:32 AM on 1/16/2019.  .  Recorded by Elida Franklin

## 2019-01-16 NOTE — NURSING NOTE
Chief Complaint   Patient presents with     Physical     Pt is here for annual physical.      Elida Franklin LPN at 8:31 AM on 1/16/2019.

## 2019-01-16 NOTE — PATIENT INSTRUCTIONS
[facility] Medication Refill Request Information:  * Please contact your pharmacy regarding ANY request for medication refills.  ** [facility] Prescription Fax = [phone]  * Please allow 3 business days for routine medication refills.  * Please allow 5 business days for controlled substance medication refills.     [facility] Test Result notification information:  *You will be notified with in 7-10 days of your appointment day regarding the results of your test.  If you are on MyChart you will be notified as soon as the provider has reviewed the results and signed off on them.    [facility]: [phone]

## 2019-10-13 NOTE — PROGRESS NOTES
HPI:  Patient is here for a routine eye check up. She would like new glasses.       Pertinent Medical History:    Tension headaches    Ocular History:    Myopia both eyes    Eye Medications:    Allergic to clindamycin    Allergic to sulfa drugs    Assessment and Plan:  1.   Myopia, both eyes.     DVO. Polycarbonate.       Medical History:  Past Medical History:   Diagnosis Date     Anxiety      Depressive disorder      Gastroesophageal reflux disease      PTSD (post-traumatic stress disorder)        Medications:  Current Outpatient Medications   Medication Sig Dispense Refill     Acetaminophen (TYLENOL PO) Take  by mouth as needed.       benzoyl peroxide 5 % LIQD Use daily as directed 226 g 3     calcium carbonate (TUMS) 500 MG chewable tablet Take 2 chew tab by mouth as needed for heartburn       Cholecalciferol (VITAMIN D PO) Take  by mouth daily.       clindamycin (CLEOCIN T) 1 % lotion Apply topically 2 times daily 60 mL 3     DULoxetine HCl (CYMBALTA PO) Take 90 mg by mouth daily        HYDROXYZINE HCL PO Take 10 mg by mouth       HydrOXYzine Pamoate (VISTARIL PO) Take 25 mg by mouth as needed for itching       LamoTRIgine (LAMICTAL PO) Take 300 mg by mouth daily       nicotine (NICORETTE) 2 MG gum Place 1 each (2 mg) inside cheek as needed for smoking cessation -Up to 6 daily. 150 each 11     Omega-3 Fatty Acids (FISH OIL PO) Take by mouth daily       paliperidone (INVEGA) 1.5 MG 24 hr tablet daily  1     propranolol (INDERAL) 10 MG tablet 10 mg by mouth twice a day as needed for anxiety       Pseudoephedrine HCl (SUDAFED PO) Take  by mouth as needed.       ranitidine (ZANTAC) 150 MG tablet Take 1 tablet (150 mg) by mouth 2 times daily Please keep appt 1/16/19. 180 tablet 3     tretinoin (RETIN-A) 0.025 % cream Apply at bedtime to your face. 45 g 3   Complete documentation of historical and exam elements from today's encounter can be found in the full encounter summary report (not reduplicated in this progress  note). I personally obtained the chief complaint(s) and history of present illness.  I confirmed and edited as necessary the review of systems, past medical/surgical history, family history, social history, and examination findings as documented by others; and I examined the patient myself. I personally reviewed the relevant tests, images, and reports as documented above. I formulated and edited as necessary the assessment and plan and discussed the findings and management plan with the patient and family. - Antonino Funez, OD

## 2019-10-14 ENCOUNTER — OFFICE VISIT (OUTPATIENT)
Dept: OPHTHALMOLOGY | Facility: CLINIC | Age: 34
End: 2019-10-14
Attending: OPTOMETRIST
Payer: COMMERCIAL

## 2019-10-14 DIAGNOSIS — H52.13 MYOPIA OF BOTH EYES: Primary | ICD-10-CM

## 2019-10-14 PROBLEM — R53.82 CHRONIC FATIGUE: Status: ACTIVE | Noted: 2018-05-10

## 2019-10-14 PROBLEM — E55.9 VITAMIN D DEFICIENCY: Status: ACTIVE | Noted: 2018-05-10

## 2019-10-14 PROCEDURE — G0463 HOSPITAL OUTPT CLINIC VISIT: HCPCS | Mod: ZF

## 2019-10-14 PROCEDURE — 92015 DETERMINE REFRACTIVE STATE: CPT | Mod: ZF

## 2019-10-14 ASSESSMENT — REFRACTION_MANIFEST
OD_AXIS: 090
OS_AXIS: 090
OD_CYLINDER: +0.75
OD_SPHERE: -1.75
OS_CYLINDER: +0.75
OS_SPHERE: -2.00

## 2019-10-14 ASSESSMENT — VISUAL ACUITY
METHOD: SNELLEN - LINEAR
OS_CC: 20/20
OD_CC: 20/20
CORRECTION_TYPE: GLASSES

## 2019-10-14 ASSESSMENT — EXTERNAL EXAM - RIGHT EYE: OD_EXAM: NORMAL

## 2019-10-14 ASSESSMENT — TONOMETRY
OD_IOP_MMHG: 21
OS_IOP_MMHG: 21
IOP_METHOD: TONOPEN

## 2019-10-14 ASSESSMENT — CUP TO DISC RATIO
OS_RATIO: 0.15
OD_RATIO: 0.15

## 2019-10-14 ASSESSMENT — REFRACTION_WEARINGRX
OD_AXIS: 090
OD_CYLINDER: +0.50
OD_SPHERE: -1.50
OS_SPHERE: -1.50
OS_CYLINDER: +0.50
OS_AXIS: 095

## 2019-10-14 ASSESSMENT — SLIT LAMP EXAM - LIDS
COMMENTS: NORMAL
COMMENTS: NORMAL

## 2019-10-14 ASSESSMENT — CONF VISUAL FIELD
OS_NORMAL: 1
OD_NORMAL: 1

## 2019-10-14 ASSESSMENT — EXTERNAL EXAM - LEFT EYE: OS_EXAM: NORMAL

## 2019-10-19 PROBLEM — M54.50 BILATERAL LOW BACK PAIN WITHOUT SCIATICA: Status: RESOLVED | Noted: 2018-11-12 | Resolved: 2019-10-19

## 2019-10-19 NOTE — PROGRESS NOTES
Discharge Note    Progress reporting period is from Nov 12, 2018 to Nov 21, 2018.     Dilia failed to return for next follow up visit and current status is unknown.  Please see information below for last relevant information on current status.  Patient seen for Rxs Used: 2 visits.  SUBJECTIVE  Subjective changes noted by patient:  Subjective: had an epidural injection yesterday so was a little sore from that. pain overall is doing better. had been taking steroid meds as well. pain is across low bac. trying using towel lumbar support. does get some tingling left lateral leg  .  Current pain level is Current Pain level: (.5/10).     Previous pain level was  Initial Pain level: 3/10.   Changes in function:  Yes (See Goal flowsheet attached for changes in current functional level)  Adverse reaction to treatment or activity: None    OBJECTIVE  Changes noted in objective findings: Objective: flexion to ankle - no incr pressure/pain. extension 40% with midline pressure ERP. prone lying - NE. BRYANNA - incr in numbness down left leg, statys worse after, and incr midline LBP. with ANU - signficant decr in numbness     ASSESSMENT/PLAN  Diagnosis: LBP   DIAGNOSIS:  LBP  Updated problem list and treatment plan:   Pain - HEP  Decreased ROM/flexibility - HEP  Decreased function - HEP  STG/LTGs have been met or progress has been made towards goals:  Yes, please see goal flowsheet for most current information  Assessment of Progress: current status is unknown.  Last current status: Assessment of progress: Pt is progressing as expected   Self Management Plans:  HEP  I have re-evaluated this patient and find that the nature, scope, duration and intensity of the therapy is appropriate for the medical condition of the patient.  Dilia continues to require the following intervention to meet STG and LTG's:  HEP.    Recommendations:  Discharge with current home program.  Patient to follow up with MD as needed.    Please refer to the daily  flowsheet for treatment today, total treatment time and time spent performing 1:1 timed codes.

## 2020-01-07 DIAGNOSIS — K29.00 ACUTE SUPERFICIAL GASTRITIS WITHOUT HEMORRHAGE: ICD-10-CM

## 2020-01-20 DIAGNOSIS — F33.2 SEVERE RECURRENT MAJOR DEPRESSION WITHOUT PSYCHOTIC FEATURES (H): Primary | ICD-10-CM

## 2020-01-20 LAB — PROLACTIN SERPL-MCNC: 46 UG/L (ref 3–27)

## 2020-01-20 PROCEDURE — 36415 COLL VENOUS BLD VENIPUNCTURE: CPT | Performed by: FAMILY MEDICINE

## 2020-01-20 PROCEDURE — 84146 ASSAY OF PROLACTIN: CPT | Performed by: FAMILY MEDICINE

## 2020-02-04 ENCOUNTER — OFFICE VISIT - HEALTHEAST (OUTPATIENT)
Dept: FAMILY MEDICINE | Facility: CLINIC | Age: 35
End: 2020-02-04

## 2020-02-04 DIAGNOSIS — F33.3 SEVERE RECURRENT MAJOR DEPRESSION WITH PSYCHOTIC FEATURES (H): ICD-10-CM

## 2020-02-04 DIAGNOSIS — R06.02 SHORTNESS OF BREATH ON EXERTION: ICD-10-CM

## 2020-02-04 DIAGNOSIS — Z00.00 ROUTINE GENERAL MEDICAL EXAMINATION AT A HEALTH CARE FACILITY: ICD-10-CM

## 2020-02-04 DIAGNOSIS — F10.21 ALCOHOL USE DISORDER, SEVERE, IN SUSTAINED REMISSION (H): ICD-10-CM

## 2020-02-04 DIAGNOSIS — F60.3 BORDERLINE PERSONALITY DISORDER (H): ICD-10-CM

## 2020-02-04 DIAGNOSIS — M79.89 SWELLING OF BOTH HANDS: ICD-10-CM

## 2020-02-04 ASSESSMENT — MIFFLIN-ST. JEOR: SCORE: 1552.04

## 2020-02-05 ASSESSMENT — PATIENT HEALTH QUESTIONNAIRE - PHQ9: SUM OF ALL RESPONSES TO PHQ QUESTIONS 1-9: 11

## 2020-02-12 ENCOUNTER — COMMUNICATION - HEALTHEAST (OUTPATIENT)
Dept: FAMILY MEDICINE | Facility: CLINIC | Age: 35
End: 2020-02-12

## 2020-02-20 ENCOUNTER — COMMUNICATION - HEALTHEAST (OUTPATIENT)
Dept: FAMILY MEDICINE | Facility: CLINIC | Age: 35
End: 2020-02-20

## 2020-02-20 DIAGNOSIS — R40.0 DAYTIME SLEEPINESS: ICD-10-CM

## 2020-05-21 ENCOUNTER — TRANSFERRED RECORDS (OUTPATIENT)
Dept: HEALTH INFORMATION MANAGEMENT | Facility: CLINIC | Age: 35
End: 2020-05-21

## 2020-06-16 ENCOUNTER — OFFICE VISIT - HEALTHEAST (OUTPATIENT)
Dept: SLEEP MEDICINE | Facility: CLINIC | Age: 35
End: 2020-06-16

## 2020-06-16 DIAGNOSIS — R06.83 SNORING: ICD-10-CM

## 2020-06-16 DIAGNOSIS — R53.82 CHRONIC FATIGUE: Primary | ICD-10-CM

## 2020-06-16 DIAGNOSIS — R53.82 CHRONIC FATIGUE: ICD-10-CM

## 2020-06-16 DIAGNOSIS — G47.20 CIRCADIAN DYSREGULATION: ICD-10-CM

## 2020-06-16 ASSESSMENT — MIFFLIN-ST. JEOR: SCORE: 1559.29

## 2020-07-14 ENCOUNTER — VIRTUAL VISIT (OUTPATIENT)
Dept: FAMILY MEDICINE | Facility: OTHER | Age: 35
End: 2020-07-14
Payer: COMMERCIAL

## 2020-07-14 PROCEDURE — 99421 OL DIG E/M SVC 5-10 MIN: CPT | Performed by: FAMILY MEDICINE

## 2020-07-16 DIAGNOSIS — Z20.822 SUSPECTED COVID-19 VIRUS INFECTION: Primary | ICD-10-CM

## 2020-07-16 PROCEDURE — U0003 INFECTIOUS AGENT DETECTION BY NUCLEIC ACID (DNA OR RNA); SEVERE ACUTE RESPIRATORY SYNDROME CORONAVIRUS 2 (SARS-COV-2) (CORONAVIRUS DISEASE [COVID-19]), AMPLIFIED PROBE TECHNIQUE, MAKING USE OF HIGH THROUGHPUT TECHNOLOGIES AS DESCRIBED BY CMS-2020-01-R: HCPCS | Performed by: FAMILY MEDICINE

## 2020-07-16 NOTE — LETTER
July 20, 2020        Dilia Lopez  [address] [facility] [address]    This letter provides a written record that you were tested for COVID-19 on 7/16/20.       Your result was negative. This means that we didn t find the virus that causes COVID-19 in your sample. A test may show negative when you do actually have the virus. This can happen when the virus is in the early stages of infection, before you feel illness symptoms.    If you have symptoms   Stay home and away from others (self-isolate) until you meet ALL of the guidelines below:    You ve had no fever--and no medicine that reduces fever--for 3 full days (72 hours). And      Your other symptoms have gotten better. For example, your cough or breathing has improved. And     At least 10 days have passed since your symptoms started.    During this time:    Stay home. Don t go to work, school or anywhere else.     Stay in your own room, including for meals. Use your own bathroom if you can.    Stay away from others in your home. No hugging, kissing or shaking hands. No visitors.    Clean  high touch  surfaces often (doorknobs, counters, handles, etc.). Use a household cleaning spray or wipes. You can find a full list on the EPA website at www.epa.gov/pesticide-registration/list-n-disinfectants-use-against-sars-cov-2.    Cover your mouth and nose with a mask, tissue or washcloth to avoid spreading germs.    Wash your hands and face often with soap and water.    Going back to work  Check with your employer for any guidelines to follow for going back to work.    Employers: This document serves as formal notice that your employee tested negative for COVID-19, as of the testing date shown above.

## 2020-07-17 LAB
SARS-COV-2 RNA SPEC QL NAA+PROBE: NOT DETECTED
SPECIMEN SOURCE: NORMAL

## 2020-10-06 ENCOUNTER — RECORDS - HEALTHEAST (OUTPATIENT)
Dept: ADMINISTRATIVE | Facility: OTHER | Age: 35
End: 2020-10-06

## 2020-10-12 ENCOUNTER — HOSPITAL ENCOUNTER (OUTPATIENT)
Dept: RADIOLOGY | Facility: CLINIC | Age: 35
Discharge: HOME | End: 2020-10-12
Attending: OBSTETRICS & GYNECOLOGY | Admitting: RADIOLOGY

## 2020-10-12 DIAGNOSIS — Z31.9 ENCOUNTER FOR PROCREATIVE MANAGEMENT: ICD-10-CM

## 2020-12-14 ENCOUNTER — HEALTH MAINTENANCE LETTER (OUTPATIENT)
Age: 35
End: 2020-12-14

## 2021-01-28 NOTE — PROGRESS NOTES
HPI:  Patient complains of left eye watering. The eyes feels dry. There are increasing headaches.     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       Pertinent Medical History:    Tension headaches    Recovering alcoholic in remission    Raynaud phenomenon    Tobacco use disorder    Ocular History:    Myopia both eyes    Eye Medications:    Allergic to clindamycin    Allergic to sulfa drugs    Use of Retin-A    Assessment and Plan:  1.   Meibomian Gland Dysfunction, both eyes.     Contributory to watering in the eyes.     Start warm compress at bedtime both eyes for at least 2 months    Start optional lid massage at bedtime both eyes for at least 2 months.     Start preservative free artificial tears 4 times daily both eyes.     2.   Myopia, both eyes.     DVO. Polycarbonate.     3.   Headaches    Follow up with PCP for management.      Medical History:  Past Medical History:   Diagnosis Date     Anxiety      Depressive disorder      Gastroesophageal reflux disease      PTSD (post-traumatic stress disorder)        Medications:  Current Outpatient Medications   Medication Sig Dispense Refill     Acetaminophen (TYLENOL PO) Take  by mouth as needed.       benzoyl peroxide 5 % LIQD Use daily as directed 226 g 3     calcium carbonate (TUMS) 500 MG chewable tablet Take 2 chew tab by mouth as needed for heartburn       Cholecalciferol (VITAMIN D PO) Take  by mouth daily.       clindamycin (CLEOCIN T) 1 % lotion Apply topically 2 times daily 60 mL 3     DULoxetine HCl (CYMBALTA PO) Take 90 mg by mouth daily        HYDROXYZINE HCL PO Take 10 mg by mouth       HydrOXYzine Pamoate (VISTARIL PO) Take 25 mg by mouth as needed for itching       LamoTRIgine (LAMICTAL PO) Take 300 mg by mouth daily       nicotine (NICORETTE) 2 MG gum Place 1 each (2 mg) inside cheek as needed for smoking cessation -Up to 6 daily. 150 each 11     Omega-3 Fatty Acids (FISH OIL PO) Take by mouth  daily       paliperidone (INVEGA) 1.5 MG 24 hr tablet daily  1     propranolol (INDERAL) 10 MG tablet 10 mg by mouth twice a day as needed for anxiety       Pseudoephedrine HCl (SUDAFED PO) Take  by mouth as needed.       ranitidine (ZANTAC) 150 MG tablet Take 1 tablet (150 mg) by mouth 2 times daily Please keep appt 1/16/19. 180 tablet 0     tretinoin (RETIN-A) 0.025 % cream Apply at bedtime to your face. 45 g 3   Complete documentation of historical and exam elements from today's encounter can be found in the full encounter summary report (not reduplicated in this progress note). I personally obtained the chief complaint(s) and history of present illness.  I confirmed and edited as necessary the review of systems, past medical/surgical history, family history, social history, and examination findings as documented by others; and I examined the patient myself. I personally reviewed the relevant tests, images, and reports as documented above. I formulated and edited as necessary the assessment and plan and discussed the findings and management plan with the patient and family. - Antonino Funez, JAMES

## 2021-01-29 ENCOUNTER — OFFICE VISIT (OUTPATIENT)
Dept: OPHTHALMOLOGY | Facility: CLINIC | Age: 36
End: 2021-01-29
Attending: OPTOMETRIST
Payer: COMMERCIAL

## 2021-01-29 DIAGNOSIS — H02.883 MEIBOMIAN GLAND DYSFUNCTION (MGD) OF BOTH EYES: Primary | ICD-10-CM

## 2021-01-29 DIAGNOSIS — H52.13 MYOPIA OF BOTH EYES: ICD-10-CM

## 2021-01-29 DIAGNOSIS — H02.886 MEIBOMIAN GLAND DYSFUNCTION (MGD) OF BOTH EYES: Primary | ICD-10-CM

## 2021-01-29 PROCEDURE — 92015 DETERMINE REFRACTIVE STATE: CPT

## 2021-01-29 PROCEDURE — 92014 COMPRE OPH EXAM EST PT 1/>: CPT | Performed by: OPTOMETRIST

## 2021-01-29 PROCEDURE — G0463 HOSPITAL OUTPT CLINIC VISIT: HCPCS

## 2021-01-29 RX ORDER — ARIPIPRAZOLE 2 MG/1
2 TABLET ORAL EVERY MORNING
COMMUNITY
Start: 2021-01-02

## 2021-01-29 RX ORDER — HYDROXYZINE HYDROCHLORIDE 10 MG/1
10 TABLET, FILM COATED ORAL
COMMUNITY

## 2021-01-29 ASSESSMENT — EXTERNAL EXAM - LEFT EYE: OS_EXAM: NORMAL

## 2021-01-29 ASSESSMENT — REFRACTION_MANIFEST
OD_AXIS: 090
OD_SPHERE: -2.00
OS_AXIS: 090
OS_SPHERE: -2.00
OS_CYLINDER: +0.75
OD_CYLINDER: +0.75

## 2021-01-29 ASSESSMENT — REFRACTION_WEARINGRX
OD_SPHERE: -1.75
OS_CYLINDER: +0.75
OS_AXIS: 090
OD_CYLINDER: +0.75
SPECS_TYPE: SVL
OD_AXIS: 090
OS_SPHERE: -1.75

## 2021-01-29 ASSESSMENT — EXTERNAL EXAM - RIGHT EYE: OD_EXAM: NORMAL

## 2021-01-29 ASSESSMENT — VISUAL ACUITY
METHOD: SNELLEN - LINEAR
CORRECTION_TYPE: GLASSES
OD_CC: 20/20
OS_CC: 20/20
OS_CC+: -3

## 2021-01-29 ASSESSMENT — TONOMETRY
IOP_METHOD: TONOPEN
OD_IOP_MMHG: 16
OS_IOP_MMHG: 15

## 2021-01-29 ASSESSMENT — SLIT LAMP EXAM - LIDS
COMMENTS: MEIBOMIAN GLAND DYSFUNCTION
COMMENTS: MEIBOMIAN GLAND DYSFUNCTION

## 2021-01-29 ASSESSMENT — CONF VISUAL FIELD
OS_NORMAL: 1
OD_NORMAL: 1
METHOD: COUNTING FINGERS

## 2021-01-29 ASSESSMENT — CUP TO DISC RATIO
OD_RATIO: 0.2
OS_RATIO: 0.2

## 2021-01-29 NOTE — PATIENT INSTRUCTIONS
1. Warm compress with dry eye mask, at bedtime, 10 minutes, both eyes.     2. Optional lid massage, at bedtime, both eyes.     3. Preservative free artificial tears (refresh plus or systane), 4 times daily, both eyes.

## 2021-01-29 NOTE — NURSING NOTE
Chief Complaint(s) and History of Present Illness(es)     Annual Eye Exam     In both eyes.  Associated symptoms include dryness, tearing and headache.  Negative for eye pain and redness.              Comments     Annual eye exam. Pt does note some changes in vision and increased headaches x the last year. Pt also c/o some tearing in the LE x the last 2 weeks usually towards the end of the day. Pt c/o some intermittent dryness of BE x the last year.     Ocular meds:  None    VERONICA Garza 8:35 AM January 29, 2021

## 2021-01-29 NOTE — LETTER
January 29, 2021      Re: Dilia SAHA   1985    To Whom It May Concern:    This is to confirm that the above patient was seen on 1/29/2021.  Dilia SAHA is unable to return to work tomorrow.    Thank you for your cooperation in this matter.  Please do not hesitate to contact me if you have any further questions.    Sincerely,      RASHEED CASTILLO CHI

## 2021-05-27 ASSESSMENT — PATIENT HEALTH QUESTIONNAIRE - PHQ9: SUM OF ALL RESPONSES TO PHQ QUESTIONS 1-9: 11

## 2021-06-01 VITALS — BODY MASS INDEX: 27.74 KG/M2 | HEIGHT: 66 IN | WEIGHT: 172.6 LBS

## 2021-06-04 VITALS
SYSTOLIC BLOOD PRESSURE: 120 MMHG | HEIGHT: 65 IN | WEIGHT: 188.4 LBS | DIASTOLIC BLOOD PRESSURE: 62 MMHG | HEART RATE: 72 BPM | RESPIRATION RATE: 20 BRPM | BODY MASS INDEX: 31.39 KG/M2

## 2021-06-04 VITALS — WEIGHT: 190 LBS | BODY MASS INDEX: 31.65 KG/M2 | HEIGHT: 65 IN

## 2021-06-05 NOTE — PROGRESS NOTES
Assessment/Plan:     1. Routine general medical examination at a health care facility  Routine history and physical, updated in EMR.  Immunizations updated today.  Pap smear is up-to-date.  Patient defers fasting labs for a future visit, these were normal 2 years ago.  Plan repeat physical in 1 year.    2. Severe recurrent major depression with psychotic features (H)  3. Borderline personality disorder (H)  Follows closely with psychiatry.  PHQ-9 score remains mildly elevated.  Patient states she feels reasonably well in this regard.    4. Alcohol use disorder, severe, in sustained remission (H)  Congratulated patient on her sustained remission.  She has been doing well abstaining from alcohol.    5. Shortness of breath on exertion  Offered work-up today including hemoglobin and thyroid.  Patient defers lab work-up, wishes to return for spirometry at a future visit.  We assisted her in scheduling this visit today.    6. Swelling of both hands  No shiny or tight skin consistent with an autoimmune issue, no joint pains consistent with rheumatoid arthritis.  Patient defers any further lab work-up today.  Discussed avoiding salt when possible.      Subjective:      Dilia Nair is a 34 y.o. female who presents for an annual exam. The patient is sexually active. The patient participates in regular exercise: yes. The patient reports that there is not domestic violence in her life.     1. Feels short of breath with exertion, feels that her heart rate goes high.  Wondering if this is a complication of her history of smoking.  2. Feels that her fingers, feet and calves will become swollen or puffy, usually upon waking.  Some aching in the hands.  Going to PT for back issues, chronic.  Noticed puffiness for the first time maybe 6 months ago.  PGM had rheumatoid arthritis.  Some dry mouth from meds, no dry eyes.    Healthy Habits:   Regular Exercise: Yes  Sunscreen Use: No  Healthy Diet: No  Dental Visits Regularly:  Yes  Seat Belt: Yes  Sexually active: Yes  Self Breast Exam Monthly:No  Lipid Profile: No  Glucose Screen: No      Immunization History   Administered Date(s) Administered     HPV Quadrivalent 02/07/2008, 06/17/2008, 03/12/2009     Tdap 11/19/2010     Immunization status: missing doses of flu, will do today.    No exam data present    Gynecologic History  Patient's last menstrual period was 02/02/2020.  Contraception: none  Last Pap: 5/4/18. Results were: normal and HPV neg      OB History   No obstetric history on file.       Current Outpatient Medications   Medication Sig Dispense Refill     clindamycin (CLEOCIN T) 1 % lotion Apply topically.       cyclobenzaprine (FLEXERIL) 10 MG tablet Take 10 mg by mouth as needed.       DULoxetine (CYMBALTA) 60 MG capsule Take 60 mg by mouth daily.       fluticasone (FLONASE) 50 mcg/actuation nasal spray 1 spray as needed.       hydrocortisone (ANUSOL-HC) 2.5 % rectal cream Insert into the rectum as needed.       hydrOXYzine pamoate (VISTARIL) 25 MG capsule Take 25 mg by mouth as needed.       lamoTRIgine (LAMICTAL) 100 MG tablet 300 mg daily.       omega-3 acid ethyl esters (LOVAZA) 1 gram capsule Take 2 g by mouth.       paliperidone (INVEGA) 3 MG 24 hr tablet Take 1.5 mg by mouth daily.       tretinoin (RETIN-A) 0.025 % cream        cholecalciferol, vitamin D3, 1,000 unit tablet Take 1,000 Units by mouth.       DULoxetine (CYMBALTA) 30 MG capsule TK 1 C PO QD FOR A TOTAL DAILY DOSE OF 90MG       hydrALAZINE (APRESOLINE) 10 MG tablet Take 10 mg by mouth as needed.       nicotine polacrilex (NICORETTE) 4 MG gum Take 4 mg by mouth.       propranolol (INDERAL) 10 MG tablet TK 1 T PO TID PRN       ranitidine (ZANTAC) 150 MG tablet Take 150 mg by mouth 2 (two) times a day.       No current facility-administered medications for this visit.      Past Medical History:   Diagnosis Date     Suicidal ideation 2017    no attempt     No past surgical history on file.  Cephalexin;  Erythromycin-sulfisoxazole; Codeine; Lurasidone; Oxycodone; Clindamycin; and Sulfasalazine  Family History   Problem Relation Age of Onset     Diabetes Mother      Cancer Father         renal     Diabetes Father      Cancer Maternal Grandmother         brain     Cancer Maternal Grandfather         lung     Heart disease Maternal Grandfather      Breast cancer Neg Hx      Colon cancer Neg Hx      Social History     Socioeconomic History     Marital status: Single     Spouse name: Not on file     Number of children: Not on file     Years of education: Not on file     Highest education level: Not on file   Occupational History     Not on file   Social Needs     Financial resource strain: Not on file     Food insecurity:     Worry: Not on file     Inability: Not on file     Transportation needs:     Medical: Not on file     Non-medical: Not on file   Tobacco Use     Smoking status: Former Smoker     Packs/day: 2.00     Years: 8.00     Pack years: 16.00     Last attempt to quit: 2016     Years since quittin.0     Smokeless tobacco: Current User   Substance and Sexual Activity     Alcohol use: No     Comment: in recovery     Drug use: Not on file     Sexual activity: Yes     Partners: Male     Birth control/protection: Condom     Comment: rodrigo Blackwood     Physical activity:     Days per week: Not on file     Minutes per session: Not on file     Stress: Not on file   Relationships     Social connections:     Talks on phone: Not on file     Gets together: Not on file     Attends [employer] service: Not on file     Active member of club or organization: Not on file     Attends meetings of clubs or organizations: Not on file     Relationship status: Not on file     Intimate partner violence:     Fear of current or ex partner: Not on file     Emotionally abused: Not on file     Physically abused: Not on file     Forced sexual activity: Not on file   Other Topics Concern     Not on file   Social History Narrative  "    Not on file       Review of Systems  General:  Denies problem  Eyes: Denies problem  Ears/Nose/Throat: Denies problem  Cardiovascular: Denies problem  Respiratory:  Shortness of breath with exertion  Gastrointestinal:  Denies problem  Genitourinary: Denies problem  Musculoskeletal:  \"Puffiness\" in the hands and feet  Skin: Denies problem  Neurologic: Denies problem  Psychiatric: Continued depressive symptoms including fatigue, feeling down, and some overeating  Endocrine: Denies problem  Heme/Lymphatic: Denies problem   Allergic/Immunologic: Denies problem        Objective:         Vitals:    02/04/20 1223   BP: 120/62   Pulse: 72   Resp: 20   Weight: 188 lb 6.4 oz (85.5 kg)   Height: 5' 5.1\" (1.654 m)     Body mass index is 31.26 kg/m .    Physical Exam:  General Appearance: Alert, cooperative, no distress, appears stated age  Head: Normocephalic, without obvious abnormality, atraumatic  Eyes: PERRL, conjunctiva/corneas clear, EOM's intact  Ears: Normal TM's and external ear canals, both ears  Nose: Nares normal, septum midline, mucosa normal, no drainage  Throat: Lips, mucosa, and tongue normal; teeth and gums normal  Neck: Supple, symmetrical, trachea midline, no adenopathy; thyroid: not enlarged, symmetric, no tenderness/mass/nodules  Back: Symmetric, no curvature, ROM normal, no CVA tenderness  Lungs: Clear to auscultation bilaterally, respirations unlabored  Breasts: No breast masses, tenderness, asymmetry, or nipple discharge; dense tissue bilaterally  Heart: Regular rate and rhythm, S1 and S2 normal, no murmur, rub, or gallop  Abdomen: Soft, non-tender, bowel sounds active all four quadrants, no masses, no organomegaly  Pelvic:Not examined  Extremities: Extremities normal, atraumatic, no cyanosis or edema  Skin: Skin color, texture, turgor normal, no rashes or lesions  Lymph nodes: Cervical, supraclavicular, and axillary nodes normal  Neurologic: Normal   Psychiatric: Affect is reactive and appropriate, " mood is mildly depressed

## 2021-06-06 NOTE — TELEPHONE ENCOUNTER
Referral Request  Type of referral: Sleep Study  Who s requesting: Patient  Why the request: Patient reports having trouble waking up feeling rested and sleeping through her alarm which has lead to her missing appointments.  This has been going on for multiple years.    Have you been seen for this request: No:  Appointment Offered:  declined  Does patient have a preference on a group/provider? no  Okay to leave a detailed message?  Yes

## 2021-06-08 NOTE — PROGRESS NOTES
"Dilia Nair is a 35 y.o. female who is being evaluated via a billable video visit.      The patient has been notified of following:     \"This video visit will be conducted via a call between you and your physician/provider. We have found that certain health care needs can be provided without the need for an in-person physical exam.  This service lets us provide the care you need with a video conversation.  If a prescription is necessary we can send it directly to your pharmacy.  If lab work is needed we can place an order for that and you can then stop by our lab to have the test done at a later time.    Video visits are billed at different rates depending on your insurance coverage. Please reach out to your insurance provider with any questions.    If during the course of the call the physician/provider feels a video visit is not appropriate, you will not be charged for this service.\"    Patient has given verbal consent to a Video visit? Yes    How would you like to obtain your AVS? AVS Preference: Mail a copy.  Patient would like the video invitation sent by: Send to e-mail at: [email or web address]    Will anyone else be joining your video visit? No    Video-Visit Details    Type of service:  Video Visit    Originating Location (pt. Location): Home    Distant Location (provider location):  [facility]     Platform used for Video Visit: [facility]    Dear Dr. Porter, Romina Abernathy Md  [address] [address]    Thank you for the opportunity to participate in the care of Mrs. Dilia Nair.    Assessment and Plan:    In summary Dilia Nair is a 35 y.o. year old female here for sleep disturbance.  1. Chronic fatigue/Snoring/Circadian dysregulation   Mrs. Dilia Nair has high risk for obstructive sleep apnea based on the history of chronic fatigue, snoring and a crowded airway. I educated the patient on the underlying pathophysiology of " obstructive sleep apnea. We reviewed the risks associated with sleep apnea, including increased cardiovascular risk and overall death. We talked about treatments briefly. I recommend getting a Home sleep study or an baseline nocturnal polysomnography. The patient would prefer the latter.The patient should return to the clinic to discuss results and treatment option in a patient-centered approach.    History of present illness:    She is a 35 y.o. female who comes to the clinic with a chief complaint of chronic fatigue that has been going on for at least 6 years. While the patient denies any episodes of witness apnea, she has been observed by her  on rare occasions of snoring. The patient also readily identifies as a night owl and admits that she drinks too much caffeine late in the afternoon. She states that she can drink up to 4 cups of coffee as well as energy drinks up till 7PM at night. The patient's review of systems is also significant for mood disorders that she believes is under control.     Ideal Sleep-Wake Cycle(devoid of societal pressure):    Patient would try to initiate sleep at around 10:30PM with a sleep latency of 10 minutes to 1 hour. The patient would have 1 awakening. Final wake up time is around 9AM. The patient would feel an extreme urge to nap at around 2PM. If she does nap, it would only be fore 1-1.5 hours.    Patient told to return in one week after the sleep study is interpreted.    Past Medical History  Past Medical History:   Diagnosis Date     Suicidal ideation 2017    no attempt        Past Surgical History  No past surgical history on file.     Meds  Current Outpatient Medications   Medication Sig Dispense Refill     ARIPiprazole (ABILIFY) 2 MG tablet 2 mg.       clindamycin (CLEOCIN T) 1 % lotion Apply topically.       cyclobenzaprine (FLEXERIL) 10 MG tablet Take 10 mg by mouth as needed.       DULoxetine (CYMBALTA) 60 MG capsule Take 60 mg by mouth daily.       hydrOXYzine  pamoate (VISTARIL) 25 MG capsule Take 25 mg by mouth as needed.       lamoTRIgine (LAMICTAL) 100 MG tablet 300 mg daily.       paliperidone (INVEGA) 3 MG 24 hr tablet Take 1.5 mg by mouth daily.       prenatal no115-iron-folic acid 29 mg iron- 1 mg Chew Chew daily.       propranolol (INDERAL) 10 MG tablet TK 1 T PO TID PRN       tretinoin (RETIN-A) 0.025 % cream        No current facility-administered medications for this visit.         Allergies  Lurasidone; Cephalexin; Erythromycin-sulfisoxazole; Codeine; Oxycodone; Clindamycin; and Sulfasalazine     Social History  Social History     Socioeconomic History     Marital status: Single     Spouse name: Not on file     Number of children: Not on file     Years of education: Not on file     Highest education level: Not on file   Occupational History     Not on file   Social Needs     Financial resource strain: Not on file     Food insecurity     Worry: Not on file     Inability: Not on file     Transportation needs     Medical: Not on file     Non-medical: Not on file   Tobacco Use     Smoking status: Former Smoker     Packs/day: 2.00     Years: 8.00     Pack years: 16.00     Last attempt to quit: 2016     Years since quittin.4     Smokeless tobacco: Former User   Substance and Sexual Activity     Alcohol use: No     Comment: in recovery     Drug use: Not Currently     Sexual activity: Yes     Partners: Male     Birth control/protection: None     Comment: rodrigo Andrea   Lifestyle     Physical activity     Days per week: Not on file     Minutes per session: Not on file     Stress: Not on file   Relationships     Social connections     Talks on phone: Not on file     Gets together: Not on file     Attends [employer] service: Not on file     Active member of club or organization: Not on file     Attends meetings of clubs or organizations: Not on file     Relationship status: Not on file     Intimate partner violence     Fear of current or ex partner: Not on file      Emotionally abused: Not on file     Physically abused: Not on file     Forced sexual activity: Not on file   Other Topics Concern     Not on file   Social History Narrative     Not on file        Family History  Family History   Problem Relation Age of Onset     Diabetes Mother      Snoring Mother      Sleep apnea Mother      Cancer Father         renal     Diabetes Father      Snoring Father      Cancer Maternal Grandmother         brain     Cancer Maternal Grandfather         lung     Heart disease Maternal Grandfather      Breast cancer Neg Hx      Colon cancer Neg Hx         Review of Systems:  Constitutional: Negative except as noted in HPI.   Eyes: Negative except as noted in HPI.   ENT: Negative except as noted in HPI.   Cardiovascular: Negative except as noted in HPI.   Respiratory: Negative except as noted in HPI.   Gastrointestinal: Negative except as noted in HPI.   Genitourinary: Negative except as noted in HPI.   Musculoskeletal: Negative except as noted in HPI.   Integumentary: Negative except as noted in HPI.   Neurological: Negative except as noted in HPI.   Psychiatric: Negative except as noted in HPI.   Endocrine: Negative except as noted in HPI.   Hematologic/Lymphatic: Negative except as noted in HPI.      STOP BANG 6/16/2020   Do you snore loudly (louder than talking or loud enough to be heard through closed doors)? 0   Do you often feel tired, fatigued, or sleepy during daytime? 1   Has anyone observed you stop breathing in your sleep? 0   Do you have or are you being treated for high blood pressure? 0   BMI more than 35 kg/m2 0   Age over 50 years old? 0   Gender male? 0     Epworths Sleepiness Scale 6/16/2020   Sitting and reading 1   Watching TV 1   Sitting, inactive in a public place (e.g. a theatre or a meeting) 1   As a passenger in a car for an hour without a break 1   Lying down to rest in the afternoon when circumstances permit 3   Sitting and talking to someone 0   Sitting quietly  "after a lunch without alcohol 2   In a car, while stopped for a few minutes in traffic 0   Total score 9     No flowsheet data found.    Physical Exam:  Ht 5' 5.1\" (1.654 m)   Wt 190 lb (86.2 kg)   BMI 31.52 kg/m    BMI:Body mass index is 31.52 kg/m .   GEN: NAD,   Head: Normocephalic.  EYES: EOMI  ENT: Oropharynx is clear, Estrella class 4+ airway.   Neurological: Alert, oriented to time, place, and person.  Psych: normal mood, normal affect     Labs/Studies:     Lab Results   Component Value Date    WBC 3.8 (L) 05/04/2018    HGB 13.1 05/04/2018    HCT 39.6 05/04/2018    MCV 90 05/04/2018     05/04/2018         Chemistry        Component Value Date/Time     05/04/2018 1156    K 4.3 05/04/2018 1156     05/04/2018 1156    CO2 26 05/04/2018 1156    BUN 13 05/04/2018 1156    CREATININE 0.75 05/04/2018 1156    GLU 78 05/04/2018 1156        Component Value Date/Time    CALCIUM 9.6 05/04/2018 1156            No results found for: FERRITIN  Lab Results   Component Value Date    TSH 0.88 05/04/2018         Patient verbalized understanding of these issues, agrees with the plan and all questions were answered today. Patient was given an opportuntity to voice any other symptoms or concerns not listed above. Patient did not have any other symptoms or concerns.         Ben Staley DO  Board Certified in Internal Medicine and Sleep Medicine    I spent a total of 34 minutes of face-to-face encounter and more than 50% of the encounter was used for counseling or coordination of care.    Audio and visual devices were used for this virtual clinic visit with permission from patient.    "

## 2021-06-17 NOTE — PROGRESS NOTES
Assessment/Plan:     1. Routine general medical examination at a health care facility  Routine history and physical, updated in EMR.  Fasting labs updated today, as is pap smear.  Immunizations are up to date.  Plan repeat physical in 1 year.    2. Chronic fatigue  Discussed getting plenty of sleep, regular exercise, stress reduction, and will pursue lab workup as below.  - Basic Metabolic Panel  - HM2(CBC w/o Differential)  - Thyroid Cascade    3. Vitamin D deficiency  Taking a daily D supplement.  Recheck today.  - Vitamin D, Total (25-Hydroxy)    4. Medication monitoring encounter  Patient states that her psychiatrist does not perform regular labs.  Labs updated as below.  - Basic Metabolic Panel  - HM2(CBC w/o Differential)    5. Overweight (BMI 25.0-29.9)  Discussed a healthy, low-carb, low sugar diet and regular cardiovascular exercise.  Recommended more cardio and less strength training, or lighter weights and more reps as opposed to larger weights with less reps if the goal is toning and weight loss.  The following high BMI interventions were performed this visit: encouragement to exercise, weight monitoring, special diet education and lifestyle education regarding diet  - Lipid [place] FASTING    6. Screening for cervical cancer  Pap smear updated today.  - Gynecologic Cytology (PAP Smear)  - HPV High Risk DNA Cervical    7. Screening for lipid disorders  - Lipid [place] FASTING      Subjective:      Dilia Lopez is a 32 y.o. female who presents for an annual exam. The patient is sexually active. The patient participates in regular exercise: yes. The patient reports that there is not domestic violence in her life.     Lots of fatigue after working out, feels like she's not getting stronger.  Feels emotional, wants to cry.  Does a strength training class, swimming.  2-3x per week cardio, strength training once weekly.  Sees Dermatology at the [facility] and Psychiatry at [facility].    Healthy  Habits:   Regular Exercise: Yes  Sunscreen Use: No  Healthy Diet: Yes  Dental Visits Regularly: Yes  Seat Belt: Yes  Sexually active: Yes  Self Breast Exam Monthly:No  Lipid Profile: Yes  Glucose Screen: Yes  Prevention of Osteoporosis: Yes  Guns at Home:  No      Immunization History   Administered Date(s) Administered     HPV Quadrivalent 02/07/2008, 06/17/2008, 03/12/2009     Tdap 11/19/2010     Immunization status: up to date and documented.    No exam data present    Gynecologic History  Patient's last menstrual period was 04/14/2018.  Contraception: condoms  Last Pap: 2015. Results were: normal      OB History   No data available       Current Outpatient Prescriptions   Medication Sig Dispense Refill     cholecalciferol, vitamin D3, 1,000 unit tablet Take 1,000 Units by mouth.       clindamycin (CLEOCIN T) 1 % lotion Apply topically.       cyclobenzaprine (FLEXERIL) 10 MG tablet Take 10 mg by mouth as needed.       DULoxetine (CYMBALTA) 60 MG capsule Take 60 mg by mouth daily.       fluticasone (FLONASE) 50 mcg/actuation nasal spray 1 spray as needed.       hydrALAZINE (APRESOLINE) 10 MG tablet Take 10 mg by mouth as needed.       hydrocortisone (ANUSOL-HC) 2.5 % rectal cream Insert into the rectum as needed.       hydrOXYzine pamoate (VISTARIL) 25 MG capsule Take 25 mg by mouth as needed.       lamoTRIgine (LAMICTAL) 100 MG tablet 300 mg daily.       nicotine polacrilex (NICORETTE) 4 MG gum Take 4 mg by mouth.       omega-3 acid ethyl esters (LOVAZA) 1 gram capsule Take 2 g by mouth.       paliperidone (INVEGA) 3 MG 24 hr tablet Take 1.5 mg by mouth daily.       ranitidine (ZANTAC) 150 MG tablet Take 150 mg by mouth 2 (two) times a day.       tretinoin (RETIN-A) 0.025 % cream        No current facility-administered medications for this visit.      No past medical history on file.  No past surgical history on file.  Cephalexin; Erythromycin-sulfisoxazole; Codeine; Lurasidone; Oxycodone; Clindamycin; and  "Sulfasalazine  No family history on file.  Social History     Social History     Marital status: Single     Spouse name: N/A     Number of children: N/A     Years of education: N/A     Occupational History     Not on file.     Social History Main Topics     Smoking status: Former Smoker     Smokeless tobacco: Current User     Alcohol use Not on file     Drug use: Not on file     Sexual activity: Not on file     Other Topics Concern     Not on file     Social History Narrative     No narrative on file       Review of Systems  General:  fatigue and weight gain as above  Eyes: Denies problem  Ears/Nose/Throat: Denies problem  Cardiovascular: Denies problem  Respiratory:  Denies problem  Gastrointestinal:  Denies problem  Genitourinary: Denies problem  Musculoskeletal:  Denies problem  Skin: Denies problem  Neurologic: Denies problem  Psychiatric: Denies problem  Endocrine: Denies problem  Heme/Lymphatic: Denies problem   Allergic/Immunologic: Denies problem        Objective:         Vitals:    05/04/18 1120   BP: 106/64   Pulse: 64   Resp: 16   Weight: 172 lb 9.6 oz (78.3 kg)   Height: 5' 5.5\" (1.664 m)     Body mass index is 28.29 kg/(m^2).    Physical Exam:  General Appearance: Alert, cooperative, no distress, appears stated age  Head: Normocephalic, without obvious abnormality, atraumatic  Eyes: PERRL, conjunctiva/corneas clear, EOM's intact  Ears: Normal TM's and external ear canals, both ears  Nose: Nares normal, septum midline, mucosa normal, no drainage  Throat: Lips, mucosa, and tongue normal; teeth and gums normal  Neck: Supple, symmetrical, trachea midline, no adenopathy; thyroid: not enlarged, symmetric, no tenderness/mass/nodules; no carotid bruit or JVD  Back: Symmetric, no curvature, ROM normal, no CVA tenderness  Lungs: Clear to auscultation bilaterally, respirations unlabored  Breasts: No breast masses, tenderness, asymmetry, or nipple discharge  Heart: Regular rate and rhythm, S1 and S2 normal, no " murmur, rub, or gallop  Abdomen: Soft, non-tender, bowel sounds active all four quadrants, no masses, no organomegaly  Pelvic: Normally developed genitalia with no external lesions or eruptions. Vagina and cervix show no lesions, inflammation, discharge or tenderness. No cystocele, No rectocele. Uterus normal to palpation.  No adnexal mass or tenderness.  Extremities: Extremities normal, atraumatic, no cyanosis or edema  Skin: Skin color, texture, turgor normal, no rashes or lesions  Lymph nodes: Cervical, supraclavicular, and axillary nodes normal  Neurologic: Normal   Psychiatric: Speech is fluent and thought process is linear, affect is reactive, mood is described as neutral

## 2021-06-18 NOTE — PATIENT INSTRUCTIONS - HE
Patient Instructions by Ben Staley DO at 6/16/2020  1:00 PM     Author: Ben Staley DO Service: -- Author Type: Physician    Filed: 6/16/2020  1:21 PM Encounter Date: 6/16/2020 Status: Signed    : Ben Staley DO (Physician)         Patient education: What is a sleep study?     What is a sleep study? -- A sleep study is a test that measures how well you sleep and checks for sleep problems. For some sleep studies, you stay overnight in a sleep lab at a hospital or sleep center.     What happens during a sleep study? -- Before you go to sleep, a technician attaches small, sticky patches called electrodes to your head, chest, and legs. He or she will also place a small tube beneath your nose and might wrap 1 or 2 belts around your chest.   Each of these items has wires that connect to monitors. The monitors record your movement, brain activity, breathing, and other body functions while you sleep.  If you have a history of trouble falling asleep, your doctor might prescribe a medicine to help you fall asleep in the lab. If you have never taken the medicine before, your doctor might ask you take it on a night before your sleep study to see how it affects you.   Why might my doctor order a sleep study? -- Your doctor will order a sleep study if he or she thinks you have sleep apnea or a different condition that makes you:   ?Have sudden jerking leg movements while you sleep, called periodic limb movements.   ?Feel very sleepy during the day and fall asleep all of a sudden, called narcolepsy.   ?Have trouble falling asleep or staying asleep over a long period of time, called chronic insomnia.   ?Do odd things while you sleep, such as walking.  How should I prepare for a sleep study? -- On the day of your sleep study, you should:   ?Avoid alcohol   ?Avoid drinking coffee, tea, sodas, and other drinks that have caffeine in the afternoon and evening   ?Take all of your regular medicines    The cost of  care estimate line is [phone]. They are able to give the patient an estimate of the charges and also an estimate of their insurance coverage/patient responsibility.  After your sleep study is performed, please call us at [phone] to schedule for a follow up to review the results of the sleep study.    Please bring one tab of low dose melatonin 3 mg or less to the night of the study.    Melatonin intake is completely voluntary.    You may take own melatonin after arrival to sleep center. Do not drive or operate machinery after intake of melatonin.

## 2021-08-07 ENCOUNTER — HEALTH MAINTENANCE LETTER (OUTPATIENT)
Age: 36
End: 2021-08-07

## 2021-08-25 ENCOUNTER — MEDICAL CORRESPONDENCE (OUTPATIENT)
Dept: HEALTH INFORMATION MANAGEMENT | Facility: CLINIC | Age: 36
End: 2021-08-25

## 2021-09-08 ENCOUNTER — LAB (OUTPATIENT)
Dept: LAB | Facility: CLINIC | Age: 36
End: 2021-09-08
Payer: COMMERCIAL

## 2021-09-08 DIAGNOSIS — Z79.899 DRUG THERAPY: Primary | ICD-10-CM

## 2021-09-08 DIAGNOSIS — F33.3 SEVERE RECURRENT MAJOR DEPRESSION WITH PSYCHOTIC FEATURES (H): ICD-10-CM

## 2021-09-08 LAB
CHOLEST SERPL-MCNC: 163 MG/DL
GLUCOSE BLD-MCNC: 112 MG/DL (ref 70–99)
HDLC SERPL-MCNC: 68 MG/DL
LDLC SERPL CALC-MCNC: 80 MG/DL
NONHDLC SERPL-MCNC: 95 MG/DL
PROLACTIN SERPL-MCNC: 25 UG/L (ref 3–27)
TRIGL SERPL-MCNC: 74 MG/DL

## 2021-09-08 PROCEDURE — 36415 COLL VENOUS BLD VENIPUNCTURE: CPT

## 2021-09-08 PROCEDURE — 84146 ASSAY OF PROLACTIN: CPT

## 2021-09-08 PROCEDURE — 82947 ASSAY GLUCOSE BLOOD QUANT: CPT

## 2021-09-08 PROCEDURE — 80061 LIPID PANEL: CPT

## 2021-10-02 ENCOUNTER — HEALTH MAINTENANCE LETTER (OUTPATIENT)
Age: 36
End: 2021-10-02

## 2022-02-02 ENCOUNTER — LAB (OUTPATIENT)
Dept: LAB | Facility: CLINIC | Age: 37
End: 2022-02-02
Payer: COMMERCIAL

## 2022-02-02 DIAGNOSIS — F25.0 SCHIZOAFFECTIVE DISORDER, BIPOLAR TYPE (H): Primary | ICD-10-CM

## 2022-02-02 LAB — PROLACTIN SERPL-MCNC: 10 UG/L (ref 3–27)

## 2022-02-02 PROCEDURE — 36415 COLL VENOUS BLD VENIPUNCTURE: CPT

## 2022-02-02 PROCEDURE — 84146 ASSAY OF PROLACTIN: CPT

## 2022-08-28 ENCOUNTER — HEALTH MAINTENANCE LETTER (OUTPATIENT)
Age: 37
End: 2022-08-28

## 2023-01-14 ENCOUNTER — HEALTH MAINTENANCE LETTER (OUTPATIENT)
Age: 38
End: 2023-01-14

## 2023-07-11 ENCOUNTER — OFFICE VISIT (OUTPATIENT)
Dept: OPTOMETRY | Facility: CLINIC | Age: 38
End: 2023-07-11
Payer: COMMERCIAL

## 2023-07-11 DIAGNOSIS — H52.223 REGULAR ASTIGMATISM OF BOTH EYES: ICD-10-CM

## 2023-07-11 DIAGNOSIS — H52.13 MYOPIA OF BOTH EYES: ICD-10-CM

## 2023-07-11 DIAGNOSIS — Z01.00 ENCOUNTER FOR EXAMINATION OF EYES AND VISION WITHOUT ABNORMAL FINDINGS: Primary | ICD-10-CM

## 2023-07-11 PROCEDURE — 92015 DETERMINE REFRACTIVE STATE: CPT | Performed by: OPTOMETRIST

## 2023-07-11 PROCEDURE — 92014 COMPRE OPH EXAM EST PT 1/>: CPT | Performed by: OPTOMETRIST

## 2023-07-11 ASSESSMENT — VISUAL ACUITY
OD_CC: 20/20
OS_CC+: -1
OS_SC: 20/80
OS_CC: 20/20
OD_SC: 20/20
METHOD: SNELLEN - LINEAR
OD_SC: 20/80
OD_CC+: -2
OS_SC: 20/20-2
CORRECTION_TYPE: GLASSES
OD_CC: 20/20-1
OS_CC: 20/20
OS_SC+: -1
OD_SC+: -1

## 2023-07-11 ASSESSMENT — CONF VISUAL FIELD
OD_SUPERIOR_NASAL_RESTRICTION: 0
OD_INFERIOR_NASAL_RESTRICTION: 0
OS_SUPERIOR_NASAL_RESTRICTION: 0
OS_SUPERIOR_TEMPORAL_RESTRICTION: 0
OD_INFERIOR_TEMPORAL_RESTRICTION: 0
OS_NORMAL: 1
OS_INFERIOR_TEMPORAL_RESTRICTION: 0
METHOD: COUNTING FINGERS
OD_SUPERIOR_TEMPORAL_RESTRICTION: 0
OD_NORMAL: 1
OS_INFERIOR_NASAL_RESTRICTION: 0

## 2023-07-11 ASSESSMENT — EXTERNAL EXAM - RIGHT EYE: OD_EXAM: NORMAL

## 2023-07-11 ASSESSMENT — TONOMETRY
IOP_METHOD: APPLANATION
OD_IOP_MMHG: 16
OS_IOP_MMHG: 16

## 2023-07-11 ASSESSMENT — REFRACTION_WEARINGRX
OD_AXIS: 090
OD_CYLINDER: +0.75
OS_AXIS: 090
SPECS_TYPE: SVL
OS_CYLINDER: +0.75
OS_SPHERE: -2.00
OD_SPHERE: -2.00

## 2023-07-11 ASSESSMENT — REFRACTION_MANIFEST
OD_SPHERE: -2.00
OS_CYLINDER: +0.50
OD_AXIS: 088
OS_SPHERE: -2.00
OD_CYLINDER: +0.75
OS_AXIS: 103

## 2023-07-11 ASSESSMENT — SLIT LAMP EXAM - LIDS
COMMENTS: MEIBOMIAN GLAND DYSFUNCTION
COMMENTS: MEIBOMIAN GLAND DYSFUNCTION

## 2023-07-11 ASSESSMENT — CUP TO DISC RATIO
OS_RATIO: 0.25
OD_RATIO: 0.25

## 2023-07-11 ASSESSMENT — EXTERNAL EXAM - LEFT EYE: OS_EXAM: NORMAL

## 2023-07-11 NOTE — LETTER
7/11/2023         RE: Dilia SAHA  [address] [facility] [address]        Dear Colleague,    Thank you for referring your patient, Dilia SAHA, to the [facility]. Please see a copy of my visit note below.    Chief Complaint   Patient presents with     Annual Eye Exam      -Noticing black spots In vision in each eye - ongoing for over a year - usually occurs after standing up too fast so wondering if it is related to blood pressure      -Floaters - small, stringy - none new but they are happening more frequently - Occasional flashes as well. No curtain effect    Last Eye Exam: 1/29/2021  Dilated Previously: Yes, side effects of dilation explained today    What are you currently using to see?  Glasses - SVL - wears full time     Distance Vision Acuity: Satisfied with vision    Near Vision Acuity: Satisfied with vision while reading and using computer with glasses    Eye Comfort: dry   Do you use eye drops? : No - does not like eye drops. Does use occasional warm compress     Occupation or Hobbies: Blind rehab specialist     Wendy Whittington  Optometry Assistant       Medical, surgical and family histories reviewed and updated 7/11/2023.       OBJECTIVE: See Ophthalmology exam    ASSESSMENT:    ICD-10-CM    1. Encounter for examination of eyes and vision without abnormal findings  Z01.00       2. Myopia of both eyes  H52.13       3. Regular astigmatism of both eyes  H52.223           PLAN:     Patient Instructions   Floaters are small specks or clouds that move in your vision. They may appear to dart away when you try to look directly at them. They are most noticeable in bright light when looking at a blank wall, a solid colored surface, or the rosa.    These happen with age as the vitreous gel ( the fluid that fills the eyeball), starts to become more liquified and shrinks to form clumps or strands. The gel then pulls away from the back of the retina leaving a  clump of tissue where the vitreous was previously attached, causing a posterior vitreous detachment.   This is more common with people that are nearsighted or have undergone certain ocular surgeries, inflammatory conditions or experienced trauma.  You should always be checked by an eye doctor right away if you have a sudden change in floaters, flashes, or change/ loss in peripheral vision. This could indicate a retinal tear, hole or detachment that could lead to permanent vision loss if not treated.    Updated glasses prescription provided today. Optional to fill.     Return in 1 year for a comprehensive eye exam, or sooner if needed.      The effects of the dilating drops last for 4- 6 hours.  You will be more sensitive to light and vision will be blurry up close.  Mydriatic sunglasses were given if needed.     Nadir Diaz, JAMES  [address] [place]  [address]    [phone]            Again, thank you for allowing me to participate in the care of your patient.        Sincerely,        Nadir Diaz OD

## 2023-07-11 NOTE — PATIENT INSTRUCTIONS
Floaters are small specks or clouds that move in your vision. They may appear to dart away when you try to look directly at them. They are most noticeable in bright light when looking at a blank wall, a solid colored surface, or the rosa.    These happen with age as the vitreous gel ( the fluid that fills the eyeball), starts to become more liquified and shrinks to form clumps or strands. The gel then pulls away from the back of the retina leaving a clump of tissue where the vitreous was previously attached, causing a posterior vitreous detachment.   This is more common with people that are nearsighted or have undergone certain ocular surgeries, inflammatory conditions or experienced trauma.  You should always be checked by an eye doctor right away if you have a sudden change in floaters, flashes, or change/ loss in peripheral vision. This could indicate a retinal tear, hole or detachment that could lead to permanent vision loss if not treated.    Updated glasses prescription provided today. Optional to fill.     Return in 1 year for a comprehensive eye exam, or sooner if needed.      The effects of the dilating drops last for 4- 6 hours.  You will be more sensitive to light and vision will be blurry up close.  Mydriatic sunglasses were given if needed.     Nadir Diaz, OD  [facility]  [address] [place] [address]. TERRENCE Sofia  [phone]

## 2023-07-11 NOTE — PROGRESS NOTES
Chief Complaint   Patient presents with     Annual Eye Exam      -Noticing black spots In vision in each eye - ongoing for over a year - usually occurs after standing up too fast so wondering if it is related to blood pressure      -Floaters - small, stringy - none new but they are happening more frequently - Occasional flashes as well. No curtain effect    Last Eye Exam: 1/29/2021  Dilated Previously: Yes, side effects of dilation explained today    What are you currently using to see?  Glasses - SVL - wears full time     Distance Vision Acuity: Satisfied with vision    Near Vision Acuity: Satisfied with vision while reading and using computer with glasses    Eye Comfort: dry   Do you use eye drops? : No - does not like eye drops. Does use occasional warm compress     Occupation or Hobbies: Blind rehab specialist     Wendy Whittington  Optometry Assistant       Medical, surgical and family histories reviewed and updated 7/11/2023.       OBJECTIVE: See Ophthalmology exam    ASSESSMENT:    ICD-10-CM    1. Encounter for examination of eyes and vision without abnormal findings  Z01.00       2. Myopia of both eyes  H52.13       3. Regular astigmatism of both eyes  H52.223           PLAN:     Patient Instructions   Floaters are small specks or clouds that move in your vision. They may appear to dart away when you try to look directly at them. They are most noticeable in bright light when looking at a blank wall, a solid colored surface, or the rosa.    These happen with age as the vitreous gel ( the fluid that fills the eyeball), starts to become more liquified and shrinks to form clumps or strands. The gel then pulls away from the back of the retina leaving a clump of tissue where the vitreous was previously attached, causing a posterior vitreous detachment.   This is more common with people that are nearsighted or have undergone certain ocular surgeries, inflammatory conditions or experienced trauma.  You should always be  checked by an eye doctor right away if you have a sudden change in floaters, flashes, or change/ loss in peripheral vision. This could indicate a retinal tear, hole or detachment that could lead to permanent vision loss if not treated.    Updated glasses prescription provided today. Optional to fill.     Return in 1 year for a comprehensive eye exam, or sooner if needed.      The effects of the dilating drops last for 4- 6 hours.  You will be more sensitive to light and vision will be blurry up close.  Mydriatic sunglasses were given if needed.     Nadir Daiz, OD  [facility]  [address] [place] [address]. [place]  [place]  [address]    [phone]

## 2023-09-18 NOTE — PROGRESS NOTES
"Date: 2020 15:44:12  Clinician: Dennis Figueroa  Clinician NPI: [id number]  Patient: Dilia Nair  Patient : 1985  Patient Address: [address] [place] [address]  Patient Phone: [phone]  Visit Protocol: URI  Patient Summary:  Dilia is a 35 year old ( : 1985 ) female who initiated a Visit for COVID-19 (Coronavirus) evaluation and screening. When asked the question \"Please sign me up to receive news, health information and promotions from [facility].\", Dilia responded \"No\".    When asked when her symptoms started, Dilia reported that she does not have any symptoms.   She denies having recent facial or sinus surgery in the past 60 days and taking antibiotic medication in the past month.    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   She does not work or volunteer as healthcare worker or a  and does not work or volunteer in a healthcare facility.   Dilia has not lived in a congregate living setting in the past 14 days. She does not live with a healthcare worker.   Dilia has not had a close contact with a laboratory-confirmed COVID-19 patient within the last 14 days.   Pertinent medical history  Dilia does not get yeast infections when she takes antibiotics.   Dilia does not need a return to work/school note.   Weight: 191 lbs   Dilia smokes or uses smokeless tobacco.   She denies pregnancy and denies breastfeeding. She has menstruated in the past month.   Additional information as reported by the patient (free text): I spent time with an individual with covid symptoms five days ago she is getting tested on Thursday.   Weight: 191 lbs    MEDICATIONS: Prenatal Vitamin oral, Invega oral, Cymbalta oral, lamotrigine oral, Abilify oral, ALLERGIES: NKDA  Clinician Response:  Dear Dilia,   Based on your exposure to COVID-19 (coronavirus), we would like to test you for this virus.  1. Please " call [phone] to schedule your visit. Explain that you were [facility] to have a COVID-19 test. Be ready to share your [facility] visit ID number.  The following will serve as your written order for this COVID Test, ordered by me, for the indication of suspected COVID [Z20.828]: The test will be ordered in [employer], our electronic health record, after you are scheduled. It will show as ordered and authorized by Good Baker MD.  Order: COVID-19 (coronavirus) PCR for ASYMPTOMATIC EXPOSURE testing from [facility].  If you know you have had close contact with someone who tested positive, you should be quarantined for 14 days after this exposure. You should stay in quarantine for the14 days even if the covid test is negative, the optimal time to test after exposure is 5-7 days from the exposure  Quarantine means   What should I do?  For safety, it's very important to follow these rules. Do this for 14 days after the date you were last exposed to the virus..  Stay home and away from others. Don't go to school or anywhere else. Generally quarantine means staying home for work but there are some exceptions to this. Please contact your workplace.   No hugging, kissing or shaking hands.  Don't let anyone visit.  Cover your mouth and nose with a mask, tissue or washcloth to avoid spreading germs.  Wash your hands and face often. Use soap and water.  What are the symptoms of COVID-19?  The most common symptoms are cough, fever and trouble breathing. Less common symptoms include headache, body aches, fatigue (feeling very tired), chills, sore throat, stuffy or runny nose, diarrhea (loose poop), loss of taste or smell, belly pain, and nausea or vomiting (feeling sick to your stomach or throwing up).  After 14 days, if you have still don't have symptoms, you likely don't have this virus.  If you develop symptoms, follow these guidelines.  If you're normally healthy: Please start another [facility] visit to report your symptoms. Go to  [email or web address].  If you have a serious health problem (like cancer, heart failure, an organ transplant or kidney disease): Call your specialty clinic. Let them know that you might have COVID-19.  2. When it's time for your COVID test:  Stay at least 6 feet away from others. (If someone will drive you to your test, stay in the backseat, as far away from the  as you can.)  Cover your mouth and nose with a mask, tissue or washcloth.  Go straight to the testing site. Don't make any stops on the way there or back.  Please note  Caregivers in these groups are at risk for severe illness due to COVID-19:  o People 65 years and older  o People who live in a nursing home or long-term care facility  o People with chronic disease (lung, heart, cancer, diabetes, kidney, liver, immunologic)  o People who have a weakened immune system, including those who:  Are in cancer treatment  Take medicine that weakens the immune system, such as corticosteroids  Had a bone marrow or organ transplant  Have an immune deficiency  Have poorly controlled HIV or AIDS  Are obese (body mass index of 40 or higher)  Smoke regularly  Where can I get more information?  [facility] -- About COVID-19: [email or web address]  CDC -- What to Do If You're Sick: www.cdc.gov/coronavirus/2019-ncov/about/steps-when-sick.html  CDC -- Ending Home Isolation: www.cdc.gov/coronavirus/2019-ncov/hcp/disposition-in-home-patients.html  [facility] -- Caring for Someone: www.cdc.gov/coronavirus/2019-ncov/if-you-are-sick/care-for-someone.html  [facility] -- Interim Guidance for Hospital Discharge to Home: www.health.[facility].[place]/diseases/coronavirus/hcp/hospdischarge.pdf  [facility] clinical trials (COVID-19 research studies): clinicalaffairs.[facility]/umn-clinical-trials  Below are the COVID-19 hotlines at the [place] Department of [facility] ([facility]). Interpreters are available.  For health questions: Call 741-111-6120 or 1-764.529.5300 (7 a.m. to 7 p.m.)  For  questions about schools and childcare: Call 582-016-4547 or 1-357.190.4010 (7 a.m. to 7 p.m.)    Diagnosis: Contact with and (suspected) exposure to other viral communicable diseases  Diagnosis ICD: Z20.828   Yes - the patient is able to be screened

## 2024-07-06 ENCOUNTER — HEALTH MAINTENANCE LETTER (OUTPATIENT)
Age: 39
End: 2024-07-06